# Patient Record
Sex: FEMALE | Race: OTHER | NOT HISPANIC OR LATINO | URBAN - METROPOLITAN AREA
[De-identification: names, ages, dates, MRNs, and addresses within clinical notes are randomized per-mention and may not be internally consistent; named-entity substitution may affect disease eponyms.]

---

## 2017-10-04 PROBLEM — Z00.00 ENCOUNTER FOR PREVENTIVE HEALTH EXAMINATION: Status: ACTIVE | Noted: 2017-10-04

## 2021-01-20 ENCOUNTER — EMERGENCY (EMERGENCY)
Facility: HOSPITAL | Age: 31
LOS: 1 days | Discharge: HOME | End: 2021-01-20
Attending: EMERGENCY MEDICINE
Payer: MEDICAID

## 2021-01-20 VITALS
DIASTOLIC BLOOD PRESSURE: 75 MMHG | RESPIRATION RATE: 18 BRPM | OXYGEN SATURATION: 100 % | HEART RATE: 93 BPM | TEMPERATURE: 98 F | SYSTOLIC BLOOD PRESSURE: 138 MMHG

## 2021-01-20 VITALS
WEIGHT: 259.93 LBS | OXYGEN SATURATION: 100 % | DIASTOLIC BLOOD PRESSURE: 91 MMHG | HEIGHT: 63 IN | SYSTOLIC BLOOD PRESSURE: 159 MMHG | HEART RATE: 109 BPM | TEMPERATURE: 98 F | RESPIRATION RATE: 18 BRPM

## 2021-01-20 LAB
ALBUMIN SERPL ELPH-MCNC: 3.8 G/DL — SIGNIFICANT CHANGE UP (ref 3.3–5)
ALP SERPL-CCNC: 81 U/L — SIGNIFICANT CHANGE UP (ref 40–120)
ALT FLD-CCNC: 10 U/L — SIGNIFICANT CHANGE UP (ref 10–45)
ANION GAP SERPL CALC-SCNC: 15 MMOL/L — SIGNIFICANT CHANGE UP (ref 5–17)
AST SERPL-CCNC: 11 U/L — SIGNIFICANT CHANGE UP (ref 10–40)
BASOPHILS # BLD AUTO: 0.05 K/UL — SIGNIFICANT CHANGE UP (ref 0–0.2)
BASOPHILS NFR BLD AUTO: 0.6 % — SIGNIFICANT CHANGE UP (ref 0–2)
BILIRUB SERPL-MCNC: 0.2 MG/DL — SIGNIFICANT CHANGE UP (ref 0.2–1.2)
BUN SERPL-MCNC: 19 MG/DL — SIGNIFICANT CHANGE UP (ref 7–23)
CALCIUM SERPL-MCNC: 9.3 MG/DL — SIGNIFICANT CHANGE UP (ref 8.4–10.5)
CHLORIDE SERPL-SCNC: 104 MMOL/L — SIGNIFICANT CHANGE UP (ref 96–108)
CO2 SERPL-SCNC: 20 MMOL/L — LOW (ref 22–31)
CREAT SERPL-MCNC: 0.78 MG/DL — SIGNIFICANT CHANGE UP (ref 0.5–1.3)
EOSINOPHIL # BLD AUTO: 0.32 K/UL — SIGNIFICANT CHANGE UP (ref 0–0.5)
EOSINOPHIL NFR BLD AUTO: 3.7 % — SIGNIFICANT CHANGE UP (ref 0–6)
GLUCOSE SERPL-MCNC: 159 MG/DL — HIGH (ref 70–99)
HCT VFR BLD CALC: 31.3 % — LOW (ref 34.5–45)
HGB BLD-MCNC: 9.2 G/DL — LOW (ref 11.5–15.5)
IMM GRANULOCYTES NFR BLD AUTO: 0.3 % — SIGNIFICANT CHANGE UP (ref 0–1.5)
LYMPHOCYTES # BLD AUTO: 2.73 K/UL — SIGNIFICANT CHANGE UP (ref 1–3.3)
LYMPHOCYTES # BLD AUTO: 31.5 % — SIGNIFICANT CHANGE UP (ref 13–44)
MCHC RBC-ENTMCNC: 21.9 PG — LOW (ref 27–34)
MCHC RBC-ENTMCNC: 29.4 GM/DL — LOW (ref 32–36)
MCV RBC AUTO: 74.3 FL — LOW (ref 80–100)
MONOCYTES # BLD AUTO: 0.58 K/UL — SIGNIFICANT CHANGE UP (ref 0–0.9)
MONOCYTES NFR BLD AUTO: 6.7 % — SIGNIFICANT CHANGE UP (ref 2–14)
NEUTROPHILS # BLD AUTO: 4.95 K/UL — SIGNIFICANT CHANGE UP (ref 1.8–7.4)
NEUTROPHILS NFR BLD AUTO: 57.2 % — SIGNIFICANT CHANGE UP (ref 43–77)
NRBC # BLD: 0 /100 WBCS — SIGNIFICANT CHANGE UP (ref 0–0)
PLATELET # BLD AUTO: 359 K/UL — SIGNIFICANT CHANGE UP (ref 150–400)
POTASSIUM SERPL-MCNC: 4.2 MMOL/L — SIGNIFICANT CHANGE UP (ref 3.5–5.3)
POTASSIUM SERPL-SCNC: 4.2 MMOL/L — SIGNIFICANT CHANGE UP (ref 3.5–5.3)
PROT SERPL-MCNC: 8.5 G/DL — HIGH (ref 6–8.3)
RBC # BLD: 4.21 M/UL — SIGNIFICANT CHANGE UP (ref 3.8–5.2)
RBC # FLD: 17 % — HIGH (ref 10.3–14.5)
SODIUM SERPL-SCNC: 139 MMOL/L — SIGNIFICANT CHANGE UP (ref 135–145)
WBC # BLD: 8.66 K/UL — SIGNIFICANT CHANGE UP (ref 3.8–10.5)
WBC # FLD AUTO: 8.66 K/UL — SIGNIFICANT CHANGE UP (ref 3.8–10.5)

## 2021-01-20 PROCEDURE — 73562 X-RAY EXAM OF KNEE 3: CPT

## 2021-01-20 PROCEDURE — 80053 COMPREHEN METABOLIC PANEL: CPT

## 2021-01-20 PROCEDURE — 85025 COMPLETE CBC W/AUTO DIFF WBC: CPT

## 2021-01-20 PROCEDURE — 99284 EMERGENCY DEPT VISIT MOD MDM: CPT

## 2021-01-20 PROCEDURE — 73562 X-RAY EXAM OF KNEE 3: CPT | Mod: 26,50

## 2021-01-20 RX ORDER — ACETAMINOPHEN 500 MG
650 TABLET ORAL ONCE
Refills: 0 | Status: COMPLETED | OUTPATIENT
Start: 2021-01-20 | End: 2021-01-20

## 2021-01-20 RX ORDER — MELOXICAM 15 MG/1
1 TABLET ORAL
Qty: 20 | Refills: 0
Start: 2021-01-20 | End: 2021-02-08

## 2021-01-20 RX ORDER — IBUPROFEN 200 MG
800 TABLET ORAL ONCE
Refills: 0 | Status: COMPLETED | OUTPATIENT
Start: 2021-01-20 | End: 2021-01-20

## 2021-01-20 RX ADMIN — Medication 650 MILLIGRAM(S): at 21:09

## 2021-01-20 RX ADMIN — Medication 800 MILLIGRAM(S): at 23:41

## 2021-01-20 NOTE — ED PROVIDER NOTE - NSFOLLOWUPCLINICS_GEN_ALL_ED_FT
[facility] Rheumatology  Rheumatology  [address] [address]  Phone: [phone]  Fax:   Follow Up Time: 7-10 Days

## 2021-01-20 NOTE — ED PROVIDER NOTE - RAPID ASSESSMENT
30 y.o F with PMHx of DM, and HTN presents with bilateral knee pain x4 months. States pain first began only in her right knee. Went to urgent care and was noted to have fluid in her knee on XR. States she has had difficulty standing for long periods of time due to pain. +swelling. Pt noted bilateral knee pain which prompted ED visit. Denies fever, chills, or knee surgeries.     Scribe Statement: I, Karlee Sweet, attest that this documentation has been prepared under the direction and in the presence of Greg Ruiz) 30 y.o F with PMHx of DM, and HTN presents with bilateral knee pain x4 months. States pain first began only in her right knee. Went to urgent care and was noted to have fluid in her knee on XR. States she has had difficulty standing for long periods of time due to pain. +swelling. Pt noted bilateral knee pain which prompted ED visit. Denies fever, chills, or knee surgeries.     Scribe Statement: I, Karlee Sweet, attest that this documentation has been prepared under the direction and in the presence of Greg Ruiz)    I, Dr. Ruiz, personally performed the service described in the documentation recorded by the scribe in my presence, and it accurately and completely records my words and actions.

## 2021-01-20 NOTE — ED ADULT NURSE REASSESSMENT NOTE - NS ED NURSE REASSESS COMMENT FT1
Report received from ZULEIMA Londono. Pt a & o x 4, able to follow commands. Breathing spontaneous & nonlabored. Pt able to ambulate with steady gait but appears in pain when walking. Call bell within reach, bed in lowest position.

## 2021-01-20 NOTE — ED PROVIDER NOTE - PHYSICAL EXAMINATION
Attn - alert, nad, lower extrem - no swelling or effusions, tender popliteal fossa bilat.  slight joint line tenderness bilat, Lachman - neg, no leg swelling or edema. skin - no rash,

## 2021-01-20 NOTE — ED PROVIDER NOTE - PATIENT PORTAL LINK FT
You can access the FollowMyHealth Patient Portal offered by [facility] by registering at the following website: [email or web address]. By joining [employer]’s FollowMyHealth portal, you will also be able to view your health information using other applications (apps) compatible with our system.

## 2021-01-20 NOTE — ED PROVIDER NOTE - NSFOLLOWUPINSTRUCTIONS_ED_ALL_ED_FT
Rx - Mobic (meloxicam) 7.5 mgs once daily  No work tomorrow  Follow up with Rheumatology for further evaluation of premature arthritis

## 2021-01-20 NOTE — ED PROVIDER NOTE - CLINICAL SUMMARY MEDICAL DECISION MAKING FREE TEXT BOX
Attn - pt with gradual onset of bilat knee pain x months with xray with OA - early for age - referral to rheum, NSAIDs

## 2021-01-20 NOTE — ED ADULT NURSE NOTE - NSIMPLEMENTINTERV_GEN_ALL_ED
Implemented All Universal Safety Interventions:  [place] to call system. Call bell, personal items and telephone within reach. Instruct patient to call for assistance. Room bathroom lighting operational. Non-slip footwear when patient is off stretcher. Physically safe environment: no spills, clutter or unnecessary equipment. Stretcher in lowest position, wheels locked, appropriate side rails in place.

## 2021-01-20 NOTE — ED ADULT NURSE NOTE - HIV OFFER
Alert and oriented x 3, NCAT.  Nontoxic appearing. oral mucosa with MMM, no injection of posterior oropharynx. Eyes with EOMI, PEERL.  Neck Supple.  Lungs CTAB, normal respiratory rate, no distress.  Cardiac with regular rate and rhythm.  Abdomen is soft, NT, ND.  No rashes.  bilateral UE and LE with FROM and strength. Sensation intact. Opt out

## 2021-01-20 NOTE — ED ADULT NURSE NOTE - OBJECTIVE STATEMENT
Pt is a 30y F p/w b/l knee pain. Endorses R knee pain x 4 months L knee pain x 2 weeks. Pt denies trauma/injury to knees. Pt works as a  and spends a lot of time on her feet, reports some swelling and increased pain after work days. Endorses feeling of stiffness in her knees and some difficulty ambulating d/t pain. Denies numbness, tingling, SOB, chest pain, fevers, sick contacts. A&Ox4, ERAZO, lungs clear, distal pulses intact, abdomen soft nontender, skin intact. Side rails up for safety, call bell and personal items within reach, instructed to call for assistance, verbalizes understanding. Will continue to monitor.

## 2021-02-13 ENCOUNTER — APPOINTMENT (OUTPATIENT)
Age: 31
End: 2021-02-13

## 2021-04-15 NOTE — ED PROVIDER NOTE - RAPID ASSESSMENT DATE/TIME
[Normal] : the outer ears and nose were normal in appearance and the oropharynx was normal 20-Jan-2021 20:57

## 2021-10-17 ENCOUNTER — EMERGENCY (EMERGENCY)
Facility: HOSPITAL | Age: 31
LOS: 1 days | Discharge: HOME | End: 2021-10-17
Attending: EMERGENCY MEDICINE
Payer: MEDICAID

## 2021-10-17 VITALS
DIASTOLIC BLOOD PRESSURE: 90 MMHG | HEART RATE: 99 BPM | HEIGHT: 63 IN | OXYGEN SATURATION: 99 % | RESPIRATION RATE: 20 BRPM | TEMPERATURE: 98 F | SYSTOLIC BLOOD PRESSURE: 154 MMHG | WEIGHT: 259.93 LBS

## 2021-10-17 VITALS
HEART RATE: 82 BPM | OXYGEN SATURATION: 98 % | TEMPERATURE: 98 F | SYSTOLIC BLOOD PRESSURE: 135 MMHG | RESPIRATION RATE: 19 BRPM | DIASTOLIC BLOOD PRESSURE: 76 MMHG

## 2021-10-17 PROBLEM — E11.9 TYPE 2 DIABETES MELLITUS WITHOUT COMPLICATIONS: Chronic | Status: ACTIVE | Noted: 2021-01-20

## 2021-10-17 LAB
ALBUMIN SERPL ELPH-MCNC: 4.2 G/DL — SIGNIFICANT CHANGE UP (ref 3.3–5)
ALP SERPL-CCNC: 70 U/L — SIGNIFICANT CHANGE UP (ref 40–120)
ALT FLD-CCNC: 8 U/L — LOW (ref 10–45)
ANION GAP SERPL CALC-SCNC: 17 MMOL/L — SIGNIFICANT CHANGE UP (ref 5–17)
APPEARANCE UR: CLEAR — SIGNIFICANT CHANGE UP
APTT BLD: 30.5 SEC — SIGNIFICANT CHANGE UP (ref 27.5–35.5)
AST SERPL-CCNC: 17 U/L — SIGNIFICANT CHANGE UP (ref 10–40)
BACTERIA # UR AUTO: NEGATIVE — SIGNIFICANT CHANGE UP
BASE EXCESS BLDV CALC-SCNC: -2.2 MMOL/L — LOW (ref -2–2)
BASOPHILS # BLD AUTO: 0.05 K/UL — SIGNIFICANT CHANGE UP (ref 0–0.2)
BASOPHILS NFR BLD AUTO: 0.6 % — SIGNIFICANT CHANGE UP (ref 0–2)
BILIRUB SERPL-MCNC: 0.4 MG/DL — SIGNIFICANT CHANGE UP (ref 0.2–1.2)
BILIRUB UR-MCNC: ABNORMAL
BUN SERPL-MCNC: 15 MG/DL — SIGNIFICANT CHANGE UP (ref 7–23)
CA-I SERPL-SCNC: 1.19 MMOL/L — SIGNIFICANT CHANGE UP (ref 1.15–1.33)
CALCIUM SERPL-MCNC: 9.1 MG/DL — SIGNIFICANT CHANGE UP (ref 8.4–10.5)
CHLORIDE BLDV-SCNC: 103 MMOL/L — SIGNIFICANT CHANGE UP (ref 96–108)
CHLORIDE SERPL-SCNC: 100 MMOL/L — SIGNIFICANT CHANGE UP (ref 96–108)
CO2 BLDV-SCNC: 25 MMOL/L — SIGNIFICANT CHANGE UP (ref 22–26)
CO2 SERPL-SCNC: 19 MMOL/L — LOW (ref 22–31)
COLOR SPEC: YELLOW — SIGNIFICANT CHANGE UP
CREAT SERPL-MCNC: 0.86 MG/DL — SIGNIFICANT CHANGE UP (ref 0.5–1.3)
DIFF PNL FLD: ABNORMAL
EOSINOPHIL # BLD AUTO: 0.04 K/UL — SIGNIFICANT CHANGE UP (ref 0–0.5)
EOSINOPHIL NFR BLD AUTO: 0.4 % — SIGNIFICANT CHANGE UP (ref 0–6)
EPI CELLS # UR: 4 /HPF — SIGNIFICANT CHANGE UP
GAS PNL BLDV: 137 MMOL/L — SIGNIFICANT CHANGE UP (ref 136–145)
GAS PNL BLDV: SIGNIFICANT CHANGE UP
GAS PNL BLDV: SIGNIFICANT CHANGE UP
GLUCOSE BLDV-MCNC: 98 MG/DL — SIGNIFICANT CHANGE UP (ref 70–99)
GLUCOSE SERPL-MCNC: 99 MG/DL — SIGNIFICANT CHANGE UP (ref 70–99)
GLUCOSE UR QL: NEGATIVE — SIGNIFICANT CHANGE UP
HCG SERPL-ACNC: <2 MIU/ML — SIGNIFICANT CHANGE UP
HCG UR QL: NEGATIVE — SIGNIFICANT CHANGE UP
HCO3 BLDV-SCNC: 24 MMOL/L — SIGNIFICANT CHANGE UP (ref 22–29)
HCT VFR BLD CALC: 28.2 % — LOW (ref 34.5–45)
HCT VFR BLDA CALC: 26 % — LOW (ref 34.5–46.5)
HGB BLD CALC-MCNC: 8.7 G/DL — LOW (ref 11.7–16.1)
HGB BLD-MCNC: 8.2 G/DL — LOW (ref 11.5–15.5)
HYALINE CASTS # UR AUTO: 8 /LPF — HIGH (ref 0–2)
IMM GRANULOCYTES NFR BLD AUTO: 0.3 % — SIGNIFICANT CHANGE UP (ref 0–1.5)
INR BLD: 1.18 RATIO — HIGH (ref 0.88–1.16)
KETONES UR-MCNC: ABNORMAL
LACTATE BLDV-MCNC: 1.5 MMOL/L — SIGNIFICANT CHANGE UP (ref 0.7–2)
LEUKOCYTE ESTERASE UR-ACNC: NEGATIVE — SIGNIFICANT CHANGE UP
LIDOCAIN IGE QN: 26 U/L — SIGNIFICANT CHANGE UP (ref 7–60)
LYMPHOCYTES # BLD AUTO: 1.3 K/UL — SIGNIFICANT CHANGE UP (ref 1–3.3)
LYMPHOCYTES # BLD AUTO: 14.3 % — SIGNIFICANT CHANGE UP (ref 13–44)
MCHC RBC-ENTMCNC: 21.8 PG — LOW (ref 27–34)
MCHC RBC-ENTMCNC: 29.1 GM/DL — LOW (ref 32–36)
MCV RBC AUTO: 75 FL — LOW (ref 80–100)
MONOCYTES # BLD AUTO: 0.48 K/UL — SIGNIFICANT CHANGE UP (ref 0–0.9)
MONOCYTES NFR BLD AUTO: 5.3 % — SIGNIFICANT CHANGE UP (ref 2–14)
NEUTROPHILS # BLD AUTO: 7.19 K/UL — SIGNIFICANT CHANGE UP (ref 1.8–7.4)
NEUTROPHILS NFR BLD AUTO: 79.1 % — HIGH (ref 43–77)
NITRITE UR-MCNC: NEGATIVE — SIGNIFICANT CHANGE UP
NRBC # BLD: 0 /100 WBCS — SIGNIFICANT CHANGE UP (ref 0–0)
OTHER CELLS CSF MANUAL: 5.2 ML/DL — LOW (ref 18–22)
PCO2 BLDV: 45 MMHG — HIGH (ref 39–42)
PH BLDV: 7.33 — SIGNIFICANT CHANGE UP (ref 7.32–7.43)
PH UR: 6 — SIGNIFICANT CHANGE UP (ref 5–8)
PLATELET # BLD AUTO: 395 K/UL — SIGNIFICANT CHANGE UP (ref 150–400)
PO2 BLDV: 28 MMHG — SIGNIFICANT CHANGE UP (ref 25–45)
POTASSIUM BLDV-SCNC: 4.7 MMOL/L — SIGNIFICANT CHANGE UP (ref 3.5–5.1)
POTASSIUM SERPL-MCNC: 4.3 MMOL/L — SIGNIFICANT CHANGE UP (ref 3.5–5.3)
POTASSIUM SERPL-SCNC: 4.3 MMOL/L — SIGNIFICANT CHANGE UP (ref 3.5–5.3)
PROT SERPL-MCNC: 8.4 G/DL — HIGH (ref 6–8.3)
PROT UR-MCNC: ABNORMAL
PROTHROM AB SERPL-ACNC: 14.1 SEC — HIGH (ref 10.6–13.6)
RBC # BLD: 3.76 M/UL — LOW (ref 3.8–5.2)
RBC # FLD: 18.9 % — HIGH (ref 10.3–14.5)
RBC CASTS # UR COMP ASSIST: 98 /HPF — HIGH (ref 0–4)
SAO2 % BLDV: 43 % — LOW (ref 67–88)
SODIUM SERPL-SCNC: 136 MMOL/L — SIGNIFICANT CHANGE UP (ref 135–145)
SP GR SPEC: 1.03 — HIGH (ref 1.01–1.02)
UROBILINOGEN FLD QL: ABNORMAL
WBC # BLD: 9.09 K/UL — SIGNIFICANT CHANGE UP (ref 3.8–10.5)
WBC # FLD AUTO: 9.09 K/UL — SIGNIFICANT CHANGE UP (ref 3.8–10.5)
WBC UR QL: 4 /HPF — SIGNIFICANT CHANGE UP (ref 0–5)

## 2021-10-17 PROCEDURE — 85730 THROMBOPLASTIN TIME PARTIAL: CPT

## 2021-10-17 PROCEDURE — 81001 URINALYSIS AUTO W/SCOPE: CPT

## 2021-10-17 PROCEDURE — 82435 ASSAY OF BLOOD CHLORIDE: CPT

## 2021-10-17 PROCEDURE — 84132 ASSAY OF SERUM POTASSIUM: CPT

## 2021-10-17 PROCEDURE — 87086 URINE CULTURE/COLONY COUNT: CPT

## 2021-10-17 PROCEDURE — 80053 COMPREHEN METABOLIC PANEL: CPT

## 2021-10-17 PROCEDURE — 99285 EMERGENCY DEPT VISIT HI MDM: CPT

## 2021-10-17 PROCEDURE — 83605 ASSAY OF LACTIC ACID: CPT

## 2021-10-17 PROCEDURE — 93975 VASCULAR STUDY: CPT | Mod: 26

## 2021-10-17 PROCEDURE — 76856 US EXAM PELVIC COMPLETE: CPT

## 2021-10-17 PROCEDURE — 76856 US EXAM PELVIC COMPLETE: CPT | Mod: 26,59

## 2021-10-17 PROCEDURE — 76830 TRANSVAGINAL US NON-OB: CPT | Mod: 26

## 2021-10-17 PROCEDURE — 93975 VASCULAR STUDY: CPT

## 2021-10-17 PROCEDURE — 85610 PROTHROMBIN TIME: CPT

## 2021-10-17 PROCEDURE — 85025 COMPLETE CBC W/AUTO DIFF WBC: CPT

## 2021-10-17 PROCEDURE — 85014 HEMATOCRIT: CPT

## 2021-10-17 PROCEDURE — 85018 HEMOGLOBIN: CPT

## 2021-10-17 PROCEDURE — 96372 THER/PROPH/DIAG INJ SC/IM: CPT

## 2021-10-17 PROCEDURE — 81025 URINE PREGNANCY TEST: CPT

## 2021-10-17 PROCEDURE — 83690 ASSAY OF LIPASE: CPT

## 2021-10-17 PROCEDURE — 84702 CHORIONIC GONADOTROPIN TEST: CPT

## 2021-10-17 PROCEDURE — 84295 ASSAY OF SERUM SODIUM: CPT

## 2021-10-17 PROCEDURE — 99284 EMERGENCY DEPT VISIT MOD MDM: CPT | Mod: 25

## 2021-10-17 PROCEDURE — 82330 ASSAY OF CALCIUM: CPT

## 2021-10-17 PROCEDURE — 82947 ASSAY GLUCOSE BLOOD QUANT: CPT

## 2021-10-17 PROCEDURE — 82803 BLOOD GASES ANY COMBINATION: CPT

## 2021-10-17 PROCEDURE — 76830 TRANSVAGINAL US NON-OB: CPT

## 2021-10-17 RX ORDER — ONDANSETRON 8 MG/1
4 TABLET, FILM COATED ORAL ONCE
Refills: 0 | Status: COMPLETED | OUTPATIENT
Start: 2021-10-17 | End: 2021-10-17

## 2021-10-17 RX ORDER — KETOROLAC TROMETHAMINE 30 MG/ML
30 SYRINGE (ML) INJECTION ONCE
Refills: 0 | Status: DISCONTINUED | OUTPATIENT
Start: 2021-10-17 | End: 2021-10-17

## 2021-10-17 RX ORDER — SODIUM CHLORIDE 9 MG/ML
1000 INJECTION INTRAMUSCULAR; INTRAVENOUS; SUBCUTANEOUS ONCE
Refills: 0 | Status: DISCONTINUED | OUTPATIENT
Start: 2021-10-17 | End: 2021-10-17

## 2021-10-17 RX ORDER — ONDANSETRON 8 MG/1
4 TABLET, FILM COATED ORAL ONCE
Refills: 0 | Status: DISCONTINUED | OUTPATIENT
Start: 2021-10-17 | End: 2021-10-17

## 2021-10-17 RX ADMIN — Medication 30 MILLIGRAM(S): at 15:37

## 2021-10-17 RX ADMIN — Medication 30 MILLIGRAM(S): at 13:42

## 2021-10-17 RX ADMIN — ONDANSETRON 4 MILLIGRAM(S): 8 TABLET, FILM COATED ORAL at 13:41

## 2021-10-17 NOTE — ED ADULT NURSE REASSESSMENT NOTE - NS ED NURSE REASSESS COMMENT FT1
unable to obtain PIV access, RN attempted 2x, MD & NP made aware, NP will attempt to gain PIV access. unable to obtain PIV access, RN attempted 2x, labs sent- MD & NP made aware, NP will attempt to gain PIV access.

## 2021-10-17 NOTE — ED ADULT NURSE REASSESSMENT NOTE - NS ED NURSE REASSESS COMMENT FT1
RN Break Coverage [phone]; report given to assigned RN Gilberto, made aware of unable to gain PIV access. RN Break Coverage [phone]; report given to assigned RN Anuradha, made aware of unable to gain PIV access.

## 2021-10-17 NOTE — ED ADULT NURSE REASSESSMENT NOTE - NS ED NURSE REASSESS COMMENT FT1
Pt is breathing unlabored on RA. Vital signs stable. Pt reports improvement in pain. Per NP, pt can drink, pt provided water. Educated pt on plan of care. Safety and comfort maintained. Awaiting US. Call bell within reach.

## 2021-10-17 NOTE — ED ADULT NURSE REASSESSMENT NOTE - NS ED NURSE REASSESS COMMENT FT1
Pt is breathing unlabored on RA. Vital signs stable. Educated pt on plan of care. Safety and comfort maintained. Warm blanket, warm packs, and water provided. Awaiting US results. Call bell within reach.

## 2021-10-17 NOTE — ED PROVIDER NOTE - NSFOLLOWUPINSTRUCTIONS_ED_ALL_ED_FT
Hydrate.    Take Ibuprofen or Tylenol for pain as package directed and respect warnings on the label.    Follow up with your GYN for reevaluation or clinic, [phone], call tomorrow for appointment.    Return for any concerns or worsening symptoms.

## 2021-10-17 NOTE — ED ADULT NURSE NOTE - NSIMPLEMENTINTERV_GEN_ALL_ED
Implemented All Universal Safety Interventions:  [place] to call system. Call bell, personal items and telephone within reach. Instruct patient to call for assistance. Room bathroom lighting operational. Non-slip footwear when patient is off stretcher. Physically safe environment: no spills, clutter or unnecessary equipment. Stretcher in lowest position, wheels locked, appropriate side rails in place.

## 2021-10-17 NOTE — ED PROVIDER NOTE - NSFOLLOWUPCLINICS_GEN_ALL_ED_FT
[facility] Gynecology and Obstetrics  Gynceology/OB  [address] [place] [address]  Phone: [phone]  Fax:

## 2021-10-17 NOTE — ED PROVIDER NOTE - OBJECTIVE STATEMENT
30yo female pt, no significant PMHx, 30yo female pt, no significant PMHx, presents to ED with lower abd pain and vaginal bleeding. Pt stated normally heavy menstrual period with PMS and usually improved after NSAIDs). She noticed this month period started 2days ago with lower abdominal cramping and took Advil without relief. No previous US. Denies urinary problems. Denies fever, chills, cough, congestion. Denies headache, dizziness, or neck/back pain. Denies CP/SOB. Denies sensory changes or weakness to extremities.

## 2021-10-17 NOTE — ED PROVIDER NOTE - PATIENT PORTAL LINK FT
You can access the FollowMyHealth Patient Portal offered by [facility] by registering at the following website: [email or web address]. By joining [employer]’s FollowMyHealth portal, you will also be able to view your health information using other applications (apps) compatible with our system.

## 2021-10-17 NOTE — ED PROVIDER NOTE - ATTENDING CONTRIBUTION TO CARE
32 y/o female presenting with l eye swelling, injected, mild pain with movement, CT orbits, iv abx, labs, mild pain, not itchy, seen at urgent care started abx ariel ointment, cephalosporin for preseptal cellulitis took for 24 hrs but not improving, no visual changes, ophth consulted, possible cdu vs admission. vss, doesn't meet SS criteria. pt is a 32 y/o female with lower abdominal pain consistent with crampy menstrual pain, not improving with motrin/apap which usually improves her menstraul pain, crampy lower abd pain with n/v, no pain over mcbruneys point, vaginal exam, us, labs, analgesia ordered.

## 2021-10-17 NOTE — ED ADULT NURSE NOTE - OBJECTIVE STATEMENT
pt is a 31y female A&Ox4 pmhx DM2, presenting to ED complaining of with lower abdominal pain. pt states the pain is attributed to her menstrual cycle and normally has heavy bleeding and cramping. she is usually able to control her pain with Tylenol/ Motrin but for the past 3 days she has not been able to causing her to not be able to tolerate any PO intake, states when she try's to eat she vomits each time, states she did not take nay medication or attempt to eat today.  she also complains of mild back pain, NV. denies urinary symptoms, fever, diarrhea, headache, cold symptoms, exposure to sick contact. pt states she is not COVID vaccinated.

## 2021-10-19 LAB
CULTURE RESULTS: SIGNIFICANT CHANGE UP
SPECIMEN SOURCE: SIGNIFICANT CHANGE UP

## 2022-04-06 ENCOUNTER — EMERGENCY (EMERGENCY)
Facility: HOSPITAL | Age: 32
LOS: 1 days | Discharge: HOME | End: 2022-04-06
Attending: EMERGENCY MEDICINE
Payer: MEDICAID

## 2022-04-06 VITALS
DIASTOLIC BLOOD PRESSURE: 97 MMHG | TEMPERATURE: 98 F | OXYGEN SATURATION: 100 % | RESPIRATION RATE: 18 BRPM | WEIGHT: 270.07 LBS | HEART RATE: 116 BPM | SYSTOLIC BLOOD PRESSURE: 160 MMHG | HEIGHT: 63 IN

## 2022-04-06 LAB
GAS PNL BLDV: SIGNIFICANT CHANGE UP
HCT VFR BLD CALC: 26.2 % — LOW (ref 34.5–45)
HGB BLD-MCNC: 7.8 G/DL — LOW (ref 11.5–15.5)
MCHC RBC-ENTMCNC: 21.5 PG — LOW (ref 27–34)
MCHC RBC-ENTMCNC: 29.8 GM/DL — LOW (ref 32–36)
MCV RBC AUTO: 72.4 FL — LOW (ref 80–100)
PLATELET # BLD AUTO: 407 K/UL — HIGH (ref 150–400)
RBC # BLD: 3.62 M/UL — LOW (ref 3.8–5.2)
RBC # FLD: 17.3 % — HIGH (ref 10.3–14.5)
WBC # BLD: 9.48 K/UL — SIGNIFICANT CHANGE UP (ref 3.8–10.5)
WBC # FLD AUTO: 9.48 K/UL — SIGNIFICANT CHANGE UP (ref 3.8–10.5)

## 2022-04-06 PROCEDURE — 76937 US GUIDE VASCULAR ACCESS: CPT | Mod: 26

## 2022-04-06 PROCEDURE — 99285 EMERGENCY DEPT VISIT HI MDM: CPT

## 2022-04-06 RX ORDER — ACETAMINOPHEN 500 MG
975 TABLET ORAL ONCE
Refills: 0 | Status: COMPLETED | OUTPATIENT
Start: 2022-04-06 | End: 2022-04-06

## 2022-04-06 RX ORDER — ONDANSETRON 8 MG/1
4 TABLET, FILM COATED ORAL ONCE
Refills: 0 | Status: COMPLETED | OUTPATIENT
Start: 2022-04-06 | End: 2022-04-06

## 2022-04-06 RX ORDER — KETOROLAC TROMETHAMINE 30 MG/ML
15 SYRINGE (ML) INJECTION ONCE
Refills: 0 | Status: DISCONTINUED | OUTPATIENT
Start: 2022-04-06 | End: 2022-04-06

## 2022-04-06 RX ORDER — SODIUM CHLORIDE 9 MG/ML
1000 INJECTION INTRAMUSCULAR; INTRAVENOUS; SUBCUTANEOUS ONCE
Refills: 0 | Status: COMPLETED | OUTPATIENT
Start: 2022-04-06 | End: 2022-04-06

## 2022-04-06 RX ORDER — MORPHINE SULFATE 50 MG/1
4 CAPSULE, EXTENDED RELEASE ORAL ONCE
Refills: 0 | Status: DISCONTINUED | OUTPATIENT
Start: 2022-04-06 | End: 2022-04-06

## 2022-04-06 RX ADMIN — SODIUM CHLORIDE 1000 MILLILITER(S): 9 INJECTION INTRAMUSCULAR; INTRAVENOUS; SUBCUTANEOUS at 23:18

## 2022-04-06 RX ADMIN — MORPHINE SULFATE 4 MILLIGRAM(S): 50 CAPSULE, EXTENDED RELEASE ORAL at 23:19

## 2022-04-06 RX ADMIN — ONDANSETRON 4 MILLIGRAM(S): 8 TABLET, FILM COATED ORAL at 23:19

## 2022-04-06 RX ADMIN — Medication 975 MILLIGRAM(S): at 23:20

## 2022-04-06 RX ADMIN — Medication 15 MILLIGRAM(S): at 23:19

## 2022-04-06 NOTE — ED PROVIDER NOTE - PROGRESS NOTE DETAILS
Evan PGY1  Patient reassessed and reports significant improvement of pain, currently 2/10.   Bloodwork showed hgh of 7.8, which is around patient's baseline. otherwise unremarkable. Pending TVUSr. Evan PGY1   Asked patient to provide a urine culture, however patient reports that she is unable to provide a sample because she doesn't have the need. Patient denies having any fever, chills, dysuria, hematuria, increased urinary frequency. Reports 1/10 pain and states that this pain is consistent with her menstrual cramps. Also updated patient on her bloodwork, specifically regarding her low hemoglobin.   Will provide PMD and GYN follow up information.

## 2022-04-06 NOTE — ED PROVIDER NOTE - NSFOLLOWUPINSTRUCTIONS_ED_ALL_ED_FT
You were seen in the emergency department for left lower abdominal pain. You were given fluids, tylenol, toradol, morphine and zofran.     For pain, you may take Tylenol (acetaminophen) and/or ibuprofen (advil or motrin). Please follow the instructions on the label/container.     Please follow up with internal medicine within 48 hours for continuation of care.   Please follow up with obgyn within 48 hours for further management.     Return to the emergency department if you experience any new/concerning/worsening symptoms such as but not limited to: fever (>100.3F), intractable nausea, vomiting, chest pain, shortness of breath, worsening pain not improving with pain medications, pain with urination, increased urination.

## 2022-04-06 NOTE — ED PROVIDER NOTE - OBJECTIVE STATEMENT
Patient is a 32 year-old-female with no past medical history presents with 3-day history of persistent severe left lower abdominal pain. States that she started her period on 4/1 which lasted 3 days, she then developed left lower abdominal pain. Feels like her menstrual cramps, however not responding to pain medications and unsure that it happened after her period. + nausea. Denies fever, chills, vomiting, urinary/bowel complaints. Sexually active with 1 partner in the last year, no history of GC/Chlam.

## 2022-04-06 NOTE — ED PROVIDER NOTE - PHYSICAL EXAMINATION
General: appears uncomfortable and in pain  HEENT: atraumatic, normocephalic; pupils are equal, round and react to light, extraocular movements intact bilaterally without deficits, no conjunctival pallor, mucous membranes moist  Neck: no jugular venous distension, full range of motion  Chest/Lung: clear to auscultation bilaterally, no wheezes/rhonchi/rales  Heart: regular rate and rhythm, no murmur/gallops/rubs  Abdomen: normal bowel sounds, soft, LLQ tenderness   Extremities: no lower extremity edema, +2 radial pulses bilaterally, +2 dorsalis pedis pulses bilaterally  Musculoskeletal: full range of motion of all 4 extremities  Nervous System: alert and oriented, no motor deficits or sensory deficits; CNII-XII grossly intact; no focal neurologic deficits  Skin: no rashes/lacerations noted

## 2022-04-06 NOTE — ED PROVIDER NOTE - NSFOLLOWUPCLINICS_GEN_ALL_ED_FT
[facility]  OB/GYN & Surg  [address] [place] [address]  Phone: [phone]  Fax:     [facility] General Internal Medicine  General Internal Medicine  [address] [place] [address]  Phone: [phone]  Fax:     [facility] Gynecology and Obstetrics  Gynceology/OB  [address] [place] [address]  Phone: [phone]  Fax:     [facility] [place]  Internal Medicine  [address] [place] [address]  Phone: [phone]  Fax: [phone]

## 2022-04-06 NOTE — ED PROVIDER NOTE - CLINICAL SUMMARY MEDICAL DECISION MAKING FREE TEXT BOX
Patient is a 32 year-old-female with no past medical history presents with 3-day history of persistent severe left lower abdominal pain. Rupture ovarian cyst vs. ovarian torsion vs. endometriosis. Low suspicion for PID/TOA due to self reported sexual history. CBC, CMP, VBG, HCG, UA, UC, TVUS. Fluids, zofran, toradol, tylenol and morphine. Dispo pending workup.

## 2022-04-06 NOTE — ED PROVIDER NOTE - NSFOLLOWUPCLINICSTOKEN_GEN_ALL_ED_FT
[id number]: || ||00\01||False;[id number]: || ||00\01||False;[id number]: || ||00\01||False;[id number]: || ||00\01||False;

## 2022-04-06 NOTE — ED PROCEDURE NOTE - PROCEDURE ADDITIONAL DETAILS
POCUS: Emergency Department Focused Ultrasound performed at patient's bedside.  The complete report may be available in PACS.   Peripheral IV access in the Emergency Department obtained under dynamic ultrasound guidance with dark nonpulsatile blood return.  Catheter was flushed afterwards without any resistance or resultant extravasation.  IV catheter confirmed in compressible vein after insertion.   18 gauge to left forearm vein

## 2022-04-06 NOTE — ED ADULT NURSE NOTE - OBJECTIVE STATEMENT
33 y/o female no significant pmh or psh presenting to ED c/o worsening persistent LLQ pain associated with nausea and dec PO intake x the passed 3 days. pt states she initially thought the discomfort was related to her menstrual cycle but states that her cycle has been over and she is still experiencing the pain, denies any vomiting but has avoided eating or drinking d/t feeling nauseas. denies any urinary s/s, dizziness, diarrhea, fevers, cough, cp, sob, known sick contacts or recent travel. pt pending ultrasound guided IV placement d/t difficult access and vasculature. pending ultrasound, safety maintained, call bell within reach.

## 2022-04-06 NOTE — ED PROVIDER NOTE - PATIENT PORTAL LINK FT
You can access the FollowMyHealth Patient Portal offered by [facility] by registering at the following website: [email or web address]. By joining [employer]’s FollowMyHealth portal, you will also be able to view your health information using other applications (apps) compatible with our system.

## 2022-04-06 NOTE — ED ADULT NURSE REASSESSMENT NOTE - NS ED NURSE REASSESS COMMENT FT1
assumed care from previous RN. MD at bedside placing USG PIV. Once placed, pt medicated as per eMAR. Labs drawn by MD and sent by MD. Pt aware to get undressed for further examination.

## 2022-04-07 VITALS
OXYGEN SATURATION: 100 % | RESPIRATION RATE: 17 BRPM | HEART RATE: 92 BPM | TEMPERATURE: 99 F | DIASTOLIC BLOOD PRESSURE: 69 MMHG | SYSTOLIC BLOOD PRESSURE: 113 MMHG

## 2022-04-07 LAB
ALBUMIN SERPL ELPH-MCNC: 3.9 G/DL — SIGNIFICANT CHANGE UP (ref 3.3–5)
ALP SERPL-CCNC: 76 U/L — SIGNIFICANT CHANGE UP (ref 40–120)
ALT FLD-CCNC: 9 U/L — LOW (ref 10–45)
ANION GAP SERPL CALC-SCNC: 16 MMOL/L — SIGNIFICANT CHANGE UP (ref 5–17)
AST SERPL-CCNC: 7 U/L — LOW (ref 10–40)
BASE EXCESS BLDV CALC-SCNC: -1.4 MMOL/L — SIGNIFICANT CHANGE UP (ref -2–2)
BASOPHILS # BLD AUTO: 0.16 K/UL — SIGNIFICANT CHANGE UP (ref 0–0.2)
BASOPHILS NFR BLD AUTO: 1.7 % — SIGNIFICANT CHANGE UP (ref 0–2)
BILIRUB SERPL-MCNC: 0.6 MG/DL — SIGNIFICANT CHANGE UP (ref 0.2–1.2)
BUN SERPL-MCNC: 11 MG/DL — SIGNIFICANT CHANGE UP (ref 7–23)
CA-I SERPL-SCNC: 1.16 MMOL/L — SIGNIFICANT CHANGE UP (ref 1.15–1.33)
CALCIUM SERPL-MCNC: 9 MG/DL — SIGNIFICANT CHANGE UP (ref 8.4–10.5)
CHLORIDE BLDV-SCNC: 104 MMOL/L — SIGNIFICANT CHANGE UP (ref 96–108)
CHLORIDE SERPL-SCNC: 102 MMOL/L — SIGNIFICANT CHANGE UP (ref 96–108)
CO2 BLDV-SCNC: 24 MMOL/L — SIGNIFICANT CHANGE UP (ref 22–26)
CO2 SERPL-SCNC: 20 MMOL/L — LOW (ref 22–31)
CREAT SERPL-MCNC: 0.94 MG/DL — SIGNIFICANT CHANGE UP (ref 0.5–1.3)
EGFR: 84 ML/MIN/1.73M2 — SIGNIFICANT CHANGE UP
EOSINOPHIL # BLD AUTO: 0.09 K/UL — SIGNIFICANT CHANGE UP (ref 0–0.5)
EOSINOPHIL NFR BLD AUTO: 0.9 % — SIGNIFICANT CHANGE UP (ref 0–6)
GAS PNL BLDV: 137 MMOL/L — SIGNIFICANT CHANGE UP (ref 136–145)
GAS PNL BLDV: SIGNIFICANT CHANGE UP
GLUCOSE BLDV-MCNC: 249 MG/DL — HIGH (ref 70–99)
GLUCOSE SERPL-MCNC: 247 MG/DL — HIGH (ref 70–99)
HCG SERPL-ACNC: <2 MIU/ML — SIGNIFICANT CHANGE UP
HCO3 BLDV-SCNC: 23 MMOL/L — SIGNIFICANT CHANGE UP (ref 22–29)
HCT VFR BLDA CALC: 24 % — LOW (ref 34.5–46.5)
HGB BLD CALC-MCNC: 7.9 G/DL — LOW (ref 11.7–16.1)
HOROWITZ INDEX BLDV+IHG-RTO: SIGNIFICANT CHANGE UP
LACTATE BLDV-MCNC: 2.1 MMOL/L — HIGH (ref 0.7–2)
LIDOCAIN IGE QN: 22 U/L — SIGNIFICANT CHANGE UP (ref 7–60)
LYMPHOCYTES # BLD AUTO: 2.72 K/UL — SIGNIFICANT CHANGE UP (ref 1–3.3)
LYMPHOCYTES # BLD AUTO: 28.7 % — SIGNIFICANT CHANGE UP (ref 13–44)
MANUAL SMEAR VERIFICATION: SIGNIFICANT CHANGE UP
MICROCYTES BLD QL: SIGNIFICANT CHANGE UP
MONOCYTES # BLD AUTO: 0.41 K/UL — SIGNIFICANT CHANGE UP (ref 0–0.9)
MONOCYTES NFR BLD AUTO: 4.3 % — SIGNIFICANT CHANGE UP (ref 2–14)
NEUTROPHILS # BLD AUTO: 6.11 K/UL — SIGNIFICANT CHANGE UP (ref 1.8–7.4)
NEUTROPHILS NFR BLD AUTO: 64.4 % — SIGNIFICANT CHANGE UP (ref 43–77)
OTHER CELLS CSF MANUAL: 5 ML/DL — LOW (ref 18–22)
PCO2 BLDV: 34 MMHG — LOW (ref 39–42)
PH BLDV: 7.43 — SIGNIFICANT CHANGE UP (ref 7.32–7.43)
PLAT MORPH BLD: NORMAL — SIGNIFICANT CHANGE UP
PO2 BLDV: 29 MMHG — SIGNIFICANT CHANGE UP (ref 25–45)
POTASSIUM BLDV-SCNC: 3.9 MMOL/L — SIGNIFICANT CHANGE UP (ref 3.5–5.1)
POTASSIUM SERPL-MCNC: 3.8 MMOL/L — SIGNIFICANT CHANGE UP (ref 3.5–5.3)
POTASSIUM SERPL-SCNC: 3.8 MMOL/L — SIGNIFICANT CHANGE UP (ref 3.5–5.3)
PROT SERPL-MCNC: 7.7 G/DL — SIGNIFICANT CHANGE UP (ref 6–8.3)
RBC BLD AUTO: ABNORMAL
SAO2 % BLDV: 45.1 % — LOW (ref 67–88)
SODIUM SERPL-SCNC: 138 MMOL/L — SIGNIFICANT CHANGE UP (ref 135–145)

## 2022-04-07 PROCEDURE — 99284 EMERGENCY DEPT VISIT MOD MDM: CPT | Mod: 25

## 2022-04-07 PROCEDURE — 76856 US EXAM PELVIC COMPLETE: CPT | Mod: 26,59

## 2022-04-07 PROCEDURE — 82435 ASSAY OF BLOOD CHLORIDE: CPT

## 2022-04-07 PROCEDURE — 84702 CHORIONIC GONADOTROPIN TEST: CPT

## 2022-04-07 PROCEDURE — 83605 ASSAY OF LACTIC ACID: CPT

## 2022-04-07 PROCEDURE — 82947 ASSAY GLUCOSE BLOOD QUANT: CPT

## 2022-04-07 PROCEDURE — 93975 VASCULAR STUDY: CPT | Mod: 26

## 2022-04-07 PROCEDURE — 76856 US EXAM PELVIC COMPLETE: CPT

## 2022-04-07 PROCEDURE — 96375 TX/PRO/DX INJ NEW DRUG ADDON: CPT

## 2022-04-07 PROCEDURE — 83690 ASSAY OF LIPASE: CPT

## 2022-04-07 PROCEDURE — 84132 ASSAY OF SERUM POTASSIUM: CPT

## 2022-04-07 PROCEDURE — 85014 HEMATOCRIT: CPT

## 2022-04-07 PROCEDURE — 76937 US GUIDE VASCULAR ACCESS: CPT

## 2022-04-07 PROCEDURE — 76830 TRANSVAGINAL US NON-OB: CPT

## 2022-04-07 PROCEDURE — 80053 COMPREHEN METABOLIC PANEL: CPT

## 2022-04-07 PROCEDURE — 84295 ASSAY OF SERUM SODIUM: CPT

## 2022-04-07 PROCEDURE — 82330 ASSAY OF CALCIUM: CPT

## 2022-04-07 PROCEDURE — 85018 HEMOGLOBIN: CPT

## 2022-04-07 PROCEDURE — 82565 ASSAY OF CREATININE: CPT

## 2022-04-07 PROCEDURE — 82803 BLOOD GASES ANY COMBINATION: CPT

## 2022-04-07 PROCEDURE — 85025 COMPLETE CBC W/AUTO DIFF WBC: CPT

## 2022-04-07 PROCEDURE — 76830 TRANSVAGINAL US NON-OB: CPT | Mod: 26

## 2022-04-07 PROCEDURE — 93975 VASCULAR STUDY: CPT

## 2022-04-07 PROCEDURE — 96374 THER/PROPH/DIAG INJ IV PUSH: CPT

## 2022-04-27 ENCOUNTER — RESULT REVIEW (OUTPATIENT)
Age: 32
End: 2022-04-27

## 2022-04-27 ENCOUNTER — OUTPATIENT (OUTPATIENT)
Dept: OUTPATIENT SERVICES | Facility: HOSPITAL | Age: 32
LOS: 1 days | End: 2022-04-27
Payer: MEDICAID

## 2022-04-27 ENCOUNTER — APPOINTMENT (OUTPATIENT)
Dept: OBGYN | Facility: HOSPITAL | Age: 32
End: 2022-04-27
Payer: MEDICAID

## 2022-04-27 VITALS
DIASTOLIC BLOOD PRESSURE: 81 MMHG | HEIGHT: 63 IN | TEMPERATURE: 98 F | SYSTOLIC BLOOD PRESSURE: 149 MMHG | BODY MASS INDEX: 51.24 KG/M2 | WEIGHT: 289.2 LBS | HEART RATE: 109 BPM

## 2022-04-27 DIAGNOSIS — Z82.49 FAMILY HISTORY OF ISCHEMIC HEART DISEASE AND OTHER DISEASES OF THE CIRCULATORY SYSTEM: ICD-10-CM

## 2022-04-27 DIAGNOSIS — L73.2 HIDRADENITIS SUPPURATIVA: ICD-10-CM

## 2022-04-27 DIAGNOSIS — Z80.3 FAMILY HISTORY OF MALIGNANT NEOPLASM OF BREAST: ICD-10-CM

## 2022-04-27 DIAGNOSIS — L02.93 CARBUNCLE, UNSPECIFIED: ICD-10-CM

## 2022-04-27 DIAGNOSIS — Z83.3 FAMILY HISTORY OF DIABETES MELLITUS: ICD-10-CM

## 2022-04-27 DIAGNOSIS — D64.9 ANEMIA, UNSPECIFIED: ICD-10-CM

## 2022-04-27 DIAGNOSIS — N80.0 ENDOMETRIOSIS OF UTERUS: ICD-10-CM

## 2022-04-27 DIAGNOSIS — Z84.1 FAMILY HISTORY OF DISORDERS OF KIDNEY AND URETER: ICD-10-CM

## 2022-04-27 DIAGNOSIS — Z78.9 OTHER SPECIFIED HEALTH STATUS: ICD-10-CM

## 2022-04-27 DIAGNOSIS — Z82.3 FAMILY HISTORY OF STROKE: ICD-10-CM

## 2022-04-27 LAB
A1C WITH ESTIMATED AVERAGE GLUCOSE RESULT: 10.9 % — HIGH (ref 4–5.6)
BASOPHILS # BLD AUTO: 0.05 K/UL — SIGNIFICANT CHANGE UP (ref 0–0.2)
BASOPHILS NFR BLD AUTO: 0.5 % — SIGNIFICANT CHANGE UP (ref 0–2)
CHOLEST SERPL-MCNC: 160 MG/DL — SIGNIFICANT CHANGE UP
EOSINOPHIL # BLD AUTO: 0.2 K/UL — SIGNIFICANT CHANGE UP (ref 0–0.5)
EOSINOPHIL NFR BLD AUTO: 2.2 % — SIGNIFICANT CHANGE UP (ref 0–6)
ESTIMATED AVERAGE GLUCOSE: 266 — SIGNIFICANT CHANGE UP
FERRITIN SERPL-MCNC: 19 NG/ML — SIGNIFICANT CHANGE UP (ref 15–150)
HCT VFR BLD CALC: 29.8 % — LOW (ref 34.5–45)
HDLC SERPL-MCNC: 48 MG/DL — LOW
HGB BLD-MCNC: 8.4 G/DL — LOW (ref 11.5–15.5)
HIV 1+2 AB+HIV1 P24 AG SERPL QL IA: SIGNIFICANT CHANGE UP
IANC: 5.94 K/UL — SIGNIFICANT CHANGE UP (ref 1.8–7.4)
IMM GRANULOCYTES NFR BLD AUTO: 1.1 % — SIGNIFICANT CHANGE UP (ref 0–1.5)
IRON SATN MFR SERPL: 11 % — LOW (ref 14–50)
IRON SATN MFR SERPL: 36 UG/DL — SIGNIFICANT CHANGE UP (ref 30–160)
LIPID PNL WITH DIRECT LDL SERPL: 80 MG/DL — SIGNIFICANT CHANGE UP
LYMPHOCYTES # BLD AUTO: 2.21 K/UL — SIGNIFICANT CHANGE UP (ref 1–3.3)
LYMPHOCYTES # BLD AUTO: 24.3 % — SIGNIFICANT CHANGE UP (ref 13–44)
MCHC RBC-ENTMCNC: 21.1 PG — LOW (ref 27–34)
MCHC RBC-ENTMCNC: 28.2 GM/DL — LOW (ref 32–36)
MCV RBC AUTO: 74.7 FL — LOW (ref 80–100)
MONOCYTES # BLD AUTO: 0.6 K/UL — SIGNIFICANT CHANGE UP (ref 0–0.9)
MONOCYTES NFR BLD AUTO: 6.6 % — SIGNIFICANT CHANGE UP (ref 2–14)
NEUTROPHILS # BLD AUTO: 5.94 K/UL — SIGNIFICANT CHANGE UP (ref 1.8–7.4)
NEUTROPHILS NFR BLD AUTO: 65.3 % — SIGNIFICANT CHANGE UP (ref 43–77)
NON HDL CHOLESTEROL: 112 MG/DL — SIGNIFICANT CHANGE UP
NRBC # BLD: 0 /100 WBCS — SIGNIFICANT CHANGE UP
NRBC # FLD: 0 K/UL — SIGNIFICANT CHANGE UP
PLATELET # BLD AUTO: 393 K/UL — SIGNIFICANT CHANGE UP (ref 150–400)
RBC # BLD: 3.99 M/UL — SIGNIFICANT CHANGE UP (ref 3.8–5.2)
RBC # FLD: 18.5 % — HIGH (ref 10.3–14.5)
TIBC SERPL-MCNC: 314 UG/DL — SIGNIFICANT CHANGE UP (ref 220–430)
TRIGL SERPL-MCNC: 161 MG/DL — HIGH
UIBC SERPL-MCNC: 278 UG/DL — SIGNIFICANT CHANGE UP (ref 110–370)
WBC # BLD: 9.1 K/UL — SIGNIFICANT CHANGE UP (ref 3.8–10.5)
WBC # FLD AUTO: 9.1 K/UL — SIGNIFICANT CHANGE UP (ref 3.8–10.5)

## 2022-04-27 PROCEDURE — 83020 HEMOGLOBIN ELECTROPHORESIS: CPT | Mod: 26

## 2022-04-27 PROCEDURE — 99203 OFFICE O/P NEW LOW 30 MIN: CPT | Mod: GE

## 2022-04-27 RX ORDER — FERROUS SULFATE 325(65) MG
325 (65 FE) TABLET ORAL DAILY
Qty: 1 | Refills: 11 | Status: ACTIVE | COMMUNITY
Start: 2022-04-27 | End: 1900-01-01

## 2022-04-28 DIAGNOSIS — Z01.419 ENCOUNTER FOR GYNECOLOGICAL EXAMINATION (GENERAL) (ROUTINE) WITHOUT ABNORMAL FINDINGS: ICD-10-CM

## 2022-04-28 DIAGNOSIS — R10.2 PELVIC AND PERINEAL PAIN: ICD-10-CM

## 2022-04-28 DIAGNOSIS — N80.0 ENDOMETRIOSIS OF UTERUS: ICD-10-CM

## 2022-04-28 LAB
CMV IGG FLD QL: <0.2 U/ML — SIGNIFICANT CHANGE UP
CMV IGG SERPL-IMP: NEGATIVE — SIGNIFICANT CHANGE UP
CMV IGM FLD-ACNC: <8 AU/ML — SIGNIFICANT CHANGE UP
CMV IGM SERPL QL: NEGATIVE — SIGNIFICANT CHANGE UP
HBV SURFACE AG SER-ACNC: SIGNIFICANT CHANGE UP
HCV AB S/CO SERPL IA: 0.11 S/CO — SIGNIFICANT CHANGE UP (ref 0–0.99)
HCV AB SERPL-IMP: SIGNIFICANT CHANGE UP
HPV HIGH+LOW RISK DNA PNL CVX: SIGNIFICANT CHANGE UP
T PALLIDUM AB TITR SER: NEGATIVE — SIGNIFICANT CHANGE UP

## 2022-04-28 RX ORDER — FERROUS SULFATE 325(65) MG
325 (65 FE) TABLET ORAL TWICE DAILY
Qty: 60 | Refills: 3 | Status: ACTIVE | COMMUNITY
Start: 2022-04-28 | End: 1900-01-01

## 2022-04-29 LAB
C TRACH RRNA SPEC QL NAA+PROBE: SIGNIFICANT CHANGE UP
HEMOGLOBIN INTERPRETATION: SIGNIFICANT CHANGE UP
HGB A MFR BLD: 97.1 % — SIGNIFICANT CHANGE UP
HGB A2 MFR BLD: 2.2 % — LOW (ref 2.4–3.5)
HGB F MFR BLD: <1 % — SIGNIFICANT CHANGE UP (ref 0–1.5)
N GONORRHOEA RRNA SPEC QL NAA+PROBE: SIGNIFICANT CHANGE UP
SPECIMEN SOURCE: SIGNIFICANT CHANGE UP

## 2022-04-30 LAB — CYTOLOGY SPEC DOC CYTO: SIGNIFICANT CHANGE UP

## 2022-05-01 PROBLEM — N80.0 ADENOMYOSIS: Status: ACTIVE | Noted: 2022-04-27

## 2022-05-01 NOTE — PHYSICAL EXAM
[Appropriately responsive] : appropriately responsive [Soft] : soft [Non-tender] : non-tender [Non-distended] : non-distended [Examination Of The Breasts] : a normal appearance [No Masses] : no breast masses were palpable [Labia Majora] : normal [Labia Minora] : normal [Normal] : normal [FreeTextEntry2] : obese [FreeTextEntry7] : upper chest/abdomen with scar tissue from boils [FreeTextEntry1] : Left labia majora with folliculitis, distinct 1cm area with swelling/erythema and purulent drainage [FreeTextEntry6] : difficult to palpate 2/2 habitus

## 2022-05-01 NOTE — HISTORY OF PRESENT ILLNESS
[FreeTextEntry1] : 31 yo G0, LMP 4/1 presenting as a new patient s/p ED visit for pelvic pain. Pt initially presented to ED on 4/6/22 with new onset pelvic pain. At that time she was recently s/p menses. Reports cramping pain before, during, and after menses that she had not previously experienced. Labs significant for H/H 7.8/26.2. TVUS obtained significant for likely adenomyosis. Patient received pain medication and was sent home with outpatient follow-up. Patient reports pain improving pain but it is getting worse the past few days because she is anticipating her period. She takes Aleve for the pain with improvement. Otherwise reports monthly menses with 3-4 days of bleeding. She is sexually active and monogamous with female partner. Denies weakness, fatigue, pain between menses.\par \par TVUS (4/6): \par Uterus: 16.7 cm x 9.1 cm x 9.6 cm. Again noted, enlarged, heterogeneous uterus with linear striation, suggesting adenomyosis. A 4.6 x 4.6 x 5.5 cm heterogeneous hypoechoic area in the posterior uterine.\par Endometrium: 8 mm.\par Cervix: A 2.0 x 0.9 x 1.6 cm heterogeneous area with increased vascularity at the endocervical region.\par Right ovary: Visualized transabdominally. 3.5 cm x 1.9 cm x 2.8 cm. Grossly unremarkable. Arterial/venous flow present.\par Left ovary: Visualized transabdominally. 2.3 cm x 1.5 cm x 1.9 cm. Question small follicle. Arterial/venous flow present.\par Fluid: None.\par IMPRESSION:\par Findings suggestive of diffuse adenomyosis. Heterogeneous hypoechoic area in the posterior uterus, which may be due to focal adenomyosis versus fibroid. Indeterminate structure at the endocervical canal. Differential diagnosis includes polyp and submucosal fibroid. Follow-up MRI may be obtained for further evaluation.\par \par Obhx: G0\par Gynhx: fibroids/adenomyosis as above; denies history of cysts; has never been to the GYN and has never had a pap smear; denies history of STIs\par \par PMH: skin boils, hydradenitis with axillary involvement; PRBC at age 18 2/2 heavy menses\par PSH: denies\par Social: Reports current depression; denies medication or therapy for treatment and not interested at this time; denies drug/alcohol/smoking use; monogamous with female partner\par Meds: Aleve PRN\par All: PCN (throat swelling)\par \par

## 2022-05-02 ENCOUNTER — APPOINTMENT (OUTPATIENT)
Dept: INTERNAL MEDICINE | Facility: CLINIC | Age: 32
End: 2022-05-02

## 2022-08-02 NOTE — ED PROVIDER NOTE - NS ED MD DISPO DISCHARGE
Hospitalist Progress Note    NAME: Bernice Hartman :  1954   MRN:  [id number]     HISTORY OF PRESENT ILLNESS:     Nerissa Lyle is a 74 PTA  -Patient moved from [facility] CM - needs SNF/LTC at [place]  -Consult to [address] [facility]- awaiting insurance Auth now  GABRIELA - Care Manager x    Consultant                       x Multidiciplinary team rounds were held today with , nursing, pharmacist and clinical coordinator. Patient's plan of care was discussed; medications were reviewed and discharge planning was addressed.      ________________________________________________________________________  Total NON critical care TIME:  16   Minutes    Total CRITICAL CARE TIME Spent:   Minutes non procedure based      Comments   >50% of visit spent in counseling and coordination of care x    ________________________________________________________________________  Kameron Loaiza Gómez Antonio MD Home

## 2024-03-18 NOTE — ED ADULT NURSE REASSESSMENT NOTE - NS ED NURSE REASSESS COMMENT FT1
Multiple RN's unable to obtain IV access. As per MD Joiner, pt does not need IV access at this time, will switch medication to PO and IM. Awaiting TVUS. Multiple RN's unable to obtain IV access. As per MD Joiner, pt does not need IV access at this time, will switch medication to PO and IM. Awaiting TVUS. Pt provided warm packs and blanket for comfort. Call bell within reach. Non-skid socks on. [de-identified] : 3-18-24 S/p BMT 12-13-23. Having drainage from right ear. Also some nasal congestion and snoring.  started gtts today. was given oral abx for "sinus" infections.  no apneas or pauses.  11-7-23 Get is a 6 month old M with ETD, nasal congestion History of reflux, on famotidine 0.4mL TID  4 ear infections in the last six months, most recent 2 weeks ago, needed IM abx No otorrhea Passed [facility]  +Nasal congestion +Snoring, intemittent and even in character No recent throat infection No bleeding or anesthesia issues

## 2025-03-22 ENCOUNTER — EMERGENCY (EMERGENCY)
Facility: HOSPITAL | Age: 35
LOS: 1 days | Discharge: HOME | End: 2025-03-22
Attending: EMERGENCY MEDICINE
Payer: COMMERCIAL

## 2025-03-22 VITALS
TEMPERATURE: 98 F | OXYGEN SATURATION: 100 % | HEART RATE: 110 BPM | RESPIRATION RATE: 19 BRPM | WEIGHT: 279.99 LBS | DIASTOLIC BLOOD PRESSURE: 81 MMHG | HEIGHT: 64 IN | SYSTOLIC BLOOD PRESSURE: 144 MMHG

## 2025-03-22 PROCEDURE — 99291 CRITICAL CARE FIRST HOUR: CPT

## 2025-03-22 NOTE — ED ADULT TRIAGE NOTE - CHIEF COMPLAINT QUOTE
"Room spinning" dizziness x 2 days. Pt also endorses nausea, abdominal cramping and 4 episodes of vomiting today. History of T2DM.

## 2025-03-23 VITALS
TEMPERATURE: 98 F | HEART RATE: 94 BPM | OXYGEN SATURATION: 100 % | DIASTOLIC BLOOD PRESSURE: 83 MMHG | SYSTOLIC BLOOD PRESSURE: 146 MMHG | RESPIRATION RATE: 18 BRPM

## 2025-03-23 LAB
ALBUMIN SERPL ELPH-MCNC: 3.8 G/DL — SIGNIFICANT CHANGE UP (ref 3.3–5)
ALP SERPL-CCNC: 74 U/L — SIGNIFICANT CHANGE UP (ref 40–120)
ALT FLD-CCNC: 6 U/L — LOW (ref 10–45)
ANION GAP SERPL CALC-SCNC: 15 MMOL/L — SIGNIFICANT CHANGE UP (ref 5–17)
ANISOCYTOSIS BLD QL: SIGNIFICANT CHANGE UP
APTT BLD: 21.6 SEC — LOW (ref 24.5–35.6)
AST SERPL-CCNC: 22 U/L — SIGNIFICANT CHANGE UP (ref 10–40)
BASOPHILS # BLD AUTO: 0 K/UL — SIGNIFICANT CHANGE UP (ref 0–0.2)
BASOPHILS # BLD AUTO: 0.09 K/UL — SIGNIFICANT CHANGE UP (ref 0–0.2)
BASOPHILS NFR BLD AUTO: 0 % — SIGNIFICANT CHANGE UP (ref 0–2)
BASOPHILS NFR BLD AUTO: 0.7 % — SIGNIFICANT CHANGE UP (ref 0–2)
BILIRUB SERPL-MCNC: 0.4 MG/DL — SIGNIFICANT CHANGE UP (ref 0.2–1.2)
BLD GP AB SCN SERPL QL: NEGATIVE — SIGNIFICANT CHANGE UP
BUN SERPL-MCNC: 14 MG/DL — SIGNIFICANT CHANGE UP (ref 7–23)
CALCIUM SERPL-MCNC: 9.2 MG/DL — SIGNIFICANT CHANGE UP (ref 8.4–10.5)
CHLORIDE SERPL-SCNC: 101 MMOL/L — SIGNIFICANT CHANGE UP (ref 96–108)
CO2 SERPL-SCNC: 20 MMOL/L — LOW (ref 22–31)
CREAT SERPL-MCNC: 1.34 MG/DL — HIGH (ref 0.5–1.3)
DACRYOCYTES BLD QL SMEAR: SIGNIFICANT CHANGE UP
EGFR: 54 ML/MIN/1.73M2 — LOW
EGFR: 54 ML/MIN/1.73M2 — LOW
ELLIPTOCYTES BLD QL SMEAR: SLIGHT — SIGNIFICANT CHANGE UP
EOSINOPHIL # BLD AUTO: 0.06 K/UL — SIGNIFICANT CHANGE UP (ref 0–0.5)
EOSINOPHIL # BLD AUTO: 0.24 K/UL — SIGNIFICANT CHANGE UP (ref 0–0.5)
EOSINOPHIL NFR BLD AUTO: 0.5 % — SIGNIFICANT CHANGE UP (ref 0–6)
EOSINOPHIL NFR BLD AUTO: 1.8 % — SIGNIFICANT CHANGE UP (ref 0–6)
GIANT PLATELETS BLD QL SMEAR: PRESENT — SIGNIFICANT CHANGE UP
GLUCOSE SERPL-MCNC: 316 MG/DL — HIGH (ref 70–99)
HCG SERPL-ACNC: <2 MIU/ML — SIGNIFICANT CHANGE UP
HCT VFR BLD CALC: 14.8 % — CRITICAL LOW (ref 34.5–45)
HCT VFR BLD CALC: 25.6 % — LOW (ref 34.5–45)
HCT VFR BLD CALC: 25.6 % — LOW (ref 34.5–45)
HGB BLD-MCNC: 3.6 G/DL — CRITICAL LOW (ref 11.5–15.5)
HGB BLD-MCNC: 7.3 G/DL — LOW (ref 11.5–15.5)
HGB BLD-MCNC: 7.7 G/DL — LOW (ref 11.5–15.5)
HYPOCHROMIA BLD QL: SIGNIFICANT CHANGE UP
IMM GRANULOCYTES NFR BLD AUTO: 0.8 % — SIGNIFICANT CHANGE UP (ref 0–0.9)
INR BLD: 1.11 RATIO — SIGNIFICANT CHANGE UP (ref 0.85–1.16)
LIDOCAIN IGE QN: 26 U/L — SIGNIFICANT CHANGE UP (ref 7–60)
LYMPHOCYTES # BLD AUTO: 1.86 K/UL — SIGNIFICANT CHANGE UP (ref 1–3.3)
LYMPHOCYTES # BLD AUTO: 1.93 K/UL — SIGNIFICANT CHANGE UP (ref 1–3.3)
LYMPHOCYTES # BLD AUTO: 13.9 % — SIGNIFICANT CHANGE UP (ref 13–44)
LYMPHOCYTES # BLD AUTO: 15.1 % — SIGNIFICANT CHANGE UP (ref 13–44)
MACROCYTES BLD QL: SLIGHT — SIGNIFICANT CHANGE UP
MANUAL SMEAR VERIFICATION: SIGNIFICANT CHANGE UP
MCHC RBC-ENTMCNC: 15.7 PG — LOW (ref 27–34)
MCHC RBC-ENTMCNC: 20.3 PG — LOW (ref 27–34)
MCHC RBC-ENTMCNC: 21.4 PG — LOW (ref 27–34)
MCHC RBC-ENTMCNC: 24.3 G/DL — LOW (ref 32–36)
MCHC RBC-ENTMCNC: 28.5 G/DL — LOW (ref 32–36)
MCHC RBC-ENTMCNC: 30.1 G/DL — LOW (ref 32–36)
MCV RBC AUTO: 64.3 FL — LOW (ref 80–100)
MCV RBC AUTO: 71.1 FL — LOW (ref 80–100)
MCV RBC AUTO: 71.3 FL — LOW (ref 80–100)
MICROCYTES BLD QL: SIGNIFICANT CHANGE UP
MONOCYTES # BLD AUTO: 0.23 K/UL — SIGNIFICANT CHANGE UP (ref 0–0.9)
MONOCYTES # BLD AUTO: 0.8 K/UL — SIGNIFICANT CHANGE UP (ref 0–0.9)
MONOCYTES NFR BLD AUTO: 1.7 % — LOW (ref 2–14)
MONOCYTES NFR BLD AUTO: 6.3 % — SIGNIFICANT CHANGE UP (ref 2–14)
MYELOCYTES NFR BLD: 1.7 % — HIGH (ref 0–0)
NEUTROPHILS # BLD AUTO: 10.81 K/UL — HIGH (ref 1.8–7.4)
NEUTROPHILS # BLD AUTO: 9.79 K/UL — HIGH (ref 1.8–7.4)
NEUTROPHILS NFR BLD AUTO: 76.6 % — SIGNIFICANT CHANGE UP (ref 43–77)
NEUTROPHILS NFR BLD AUTO: 80.9 % — HIGH (ref 43–77)
NRBC BLD AUTO-RTO: 0 /100 WBCS — SIGNIFICANT CHANGE UP (ref 0–0)
NRBC BLD AUTO-RTO: 0 /100 WBCS — SIGNIFICANT CHANGE UP (ref 0–0)
OVALOCYTES BLD QL SMEAR: SLIGHT — SIGNIFICANT CHANGE UP
PLAT MORPH BLD: NORMAL — SIGNIFICANT CHANGE UP
PLATELET # BLD AUTO: 397 K/UL — SIGNIFICANT CHANGE UP (ref 150–400)
PLATELET # BLD AUTO: 439 K/UL — HIGH (ref 150–400)
PLATELET # BLD AUTO: 456 K/UL — HIGH (ref 150–400)
POIKILOCYTOSIS BLD QL AUTO: SIGNIFICANT CHANGE UP
POLYCHROMASIA BLD QL SMEAR: SLIGHT — SIGNIFICANT CHANGE UP
POTASSIUM SERPL-MCNC: 5 MMOL/L — SIGNIFICANT CHANGE UP (ref 3.5–5.3)
POTASSIUM SERPL-SCNC: 5 MMOL/L — SIGNIFICANT CHANGE UP (ref 3.5–5.3)
PROT SERPL-MCNC: 7.6 G/DL — SIGNIFICANT CHANGE UP (ref 6–8.3)
PROTHROM AB SERPL-ACNC: 12.6 SEC — SIGNIFICANT CHANGE UP (ref 9.9–13.4)
RBC # BLD: 2.3 M/UL — LOW (ref 3.8–5.2)
RBC # BLD: 3.59 M/UL — LOW (ref 3.8–5.2)
RBC # BLD: 3.6 M/UL — LOW (ref 3.8–5.2)
RBC # FLD: 22.3 % — HIGH (ref 10.3–14.5)
RBC # FLD: 24.9 % — HIGH (ref 10.3–14.5)
RBC # FLD: 25.6 % — HIGH (ref 10.3–14.5)
RBC BLD AUTO: ABNORMAL
RH IG SCN BLD-IMP: POSITIVE — SIGNIFICANT CHANGE UP
SCHISTOCYTES BLD QL AUTO: SIGNIFICANT CHANGE UP
SODIUM SERPL-SCNC: 136 MMOL/L — SIGNIFICANT CHANGE UP (ref 135–145)
STOMATOCYTES BLD QL SMEAR: SIGNIFICANT CHANGE UP
WBC # BLD: 12.77 K/UL — HIGH (ref 3.8–10.5)
WBC # BLD: 13.36 K/UL — HIGH (ref 3.8–10.5)
WBC # BLD: 15.25 K/UL — HIGH (ref 3.8–10.5)
WBC # FLD AUTO: 12.77 K/UL — HIGH (ref 3.8–10.5)
WBC # FLD AUTO: 13.36 K/UL — HIGH (ref 3.8–10.5)
WBC # FLD AUTO: 15.25 K/UL — HIGH (ref 3.8–10.5)

## 2025-03-23 PROCEDURE — 36430 TRANSFUSION BLD/BLD COMPNT: CPT

## 2025-03-23 PROCEDURE — 85730 THROMBOPLASTIN TIME PARTIAL: CPT

## 2025-03-23 PROCEDURE — 93975 VASCULAR STUDY: CPT

## 2025-03-23 PROCEDURE — 86850 RBC ANTIBODY SCREEN: CPT

## 2025-03-23 PROCEDURE — 85025 COMPLETE CBC W/AUTO DIFF WBC: CPT

## 2025-03-23 PROCEDURE — 76856 US EXAM PELVIC COMPLETE: CPT | Mod: 26,59

## 2025-03-23 PROCEDURE — 85610 PROTHROMBIN TIME: CPT

## 2025-03-23 PROCEDURE — 96374 THER/PROPH/DIAG INJ IV PUSH: CPT

## 2025-03-23 PROCEDURE — 86901 BLOOD TYPING SEROLOGIC RH(D): CPT

## 2025-03-23 PROCEDURE — 99285 EMERGENCY DEPT VISIT HI MDM: CPT | Mod: 25

## 2025-03-23 PROCEDURE — 99284 EMERGENCY DEPT VISIT MOD MDM: CPT | Mod: GC

## 2025-03-23 PROCEDURE — 80053 COMPREHEN METABOLIC PANEL: CPT

## 2025-03-23 PROCEDURE — 76856 US EXAM PELVIC COMPLETE: CPT

## 2025-03-23 PROCEDURE — 76830 TRANSVAGINAL US NON-OB: CPT

## 2025-03-23 PROCEDURE — 83690 ASSAY OF LIPASE: CPT

## 2025-03-23 PROCEDURE — 85027 COMPLETE CBC AUTOMATED: CPT

## 2025-03-23 PROCEDURE — 93005 ELECTROCARDIOGRAM TRACING: CPT

## 2025-03-23 PROCEDURE — 83036 HEMOGLOBIN GLYCOSYLATED A1C: CPT

## 2025-03-23 PROCEDURE — P9016: CPT

## 2025-03-23 PROCEDURE — 76830 TRANSVAGINAL US NON-OB: CPT | Mod: 26

## 2025-03-23 PROCEDURE — 93010 ELECTROCARDIOGRAM REPORT: CPT

## 2025-03-23 PROCEDURE — 36415 COLL VENOUS BLD VENIPUNCTURE: CPT

## 2025-03-23 PROCEDURE — 86923 COMPATIBILITY TEST ELECTRIC: CPT

## 2025-03-23 PROCEDURE — 86900 BLOOD TYPING SEROLOGIC ABO: CPT

## 2025-03-23 PROCEDURE — 84702 CHORIONIC GONADOTROPIN TEST: CPT

## 2025-03-23 PROCEDURE — G0378: CPT

## 2025-03-23 PROCEDURE — 93975 VASCULAR STUDY: CPT | Mod: 26

## 2025-03-23 PROCEDURE — 99223 1ST HOSP IP/OBS HIGH 75: CPT

## 2025-03-23 RX ORDER — ACETAMINOPHEN 500 MG/5ML
975 LIQUID (ML) ORAL ONCE
Refills: 0 | Status: COMPLETED | OUTPATIENT
Start: 2025-03-23 | End: 2025-03-23

## 2025-03-23 RX ORDER — KETOROLAC TROMETHAMINE 30 MG/ML
15 INJECTION, SOLUTION INTRAMUSCULAR; INTRAVENOUS ONCE
Refills: 0 | Status: DISCONTINUED | OUTPATIENT
Start: 2025-03-23 | End: 2025-03-23

## 2025-03-23 RX ADMIN — KETOROLAC TROMETHAMINE 15 MILLIGRAM(S): 30 INJECTION, SOLUTION INTRAMUSCULAR; INTRAVENOUS at 04:00

## 2025-03-23 RX ADMIN — Medication 975 MILLIGRAM(S): at 13:08

## 2025-03-23 RX ADMIN — KETOROLAC TROMETHAMINE 15 MILLIGRAM(S): 30 INJECTION, SOLUTION INTRAMUSCULAR; INTRAVENOUS at 03:15

## 2025-03-23 NOTE — ED CDU PROVIDER INITIAL DAY NOTE - OBJECTIVE STATEMENT
This is a 35-year-old female  with a history of heavy menstrual bleeding now coming in with lightheadedness associated with heavy menses. Her last period was 10 March lasted 3 days was fairly heavy resolved and then she developed a few days of very heavy clots with minimal blood.  She also has abdominal pain/pelvic pain/suprapubic pain.  No fevers chills head strike.  When she gets up she is really symptomatic.  She is short of breath and fatigue.  No chest discomfort. last transfusion was when she was 18. takes iron daily. of note patient indicates that she has not seeen a doctor in many years, she does not know her baseline hgb This is a 35-year-old female  with a history of heavy menstrual bleeding now coming in with lightheadedness associated with heavy menses. Her last period was 10 March lasted 3 days was fairly heavy resolved and then she developed a few days of very heavy clots with minimal blood.  She also has abdominal pain/pelvic pain/suprapubic pain.  No fevers chills head strike.  When she gets up she is really symptomatic.  She is short of breath and fatigue.  No chest discomfort. last transfusion was when she was 18. takes iron daily. of note patient indicates that she has not seen a doctor in many years, she does not know her baseline hgb

## 2025-03-23 NOTE — ED PROVIDER NOTE - PHYSICAL EXAMINATION
Patient is awake alert talking and appears in moderate distress.  She is pale.  Tachycardic and regular.  Clear lungs.  Abdomen is mostly soft however in the suprapubic region there is lumpiness presumably to her uterus.  These lumpy areas are quite hard.  Somewhat uncomfortable to palpation.  No leg edema.  Somewhat cool and somewhat toxic.

## 2025-03-23 NOTE — CHART NOTE - NSCHARTNOTEFT_GEN_A_CORE
R2 GYN Progress Note       Patient seen and examined at bedside. Patient reports no longer feeling lightheaded or dizzy since last night, improvement in sx after blood transfusion. Denies vaginal bleeding. Reports mild abdominal cramping. Denies CP, SOB, N/V, fevers, and chills.    Vital Signs Last 24 Hours  T(C): 36.7 (03-23-25 @ 12:40), Max: 37.3 (03-23-25 @ 05:01)  HR: 107 (03-23-25 @ 12:40) (92 - 110)  BP: 134/61 (03-23-25 @ 12:40) (125/58 - 168/73)  RR: 16 (03-23-25 @ 12:40) (15 - 22)  SpO2: 100% (03-23-25 @ 12:40) (99% - 100%)    I&O's Summary      Physical Exam:   General: sitting comfortably in bed, NAD   Lungs: non labored breathing on RA   Abd: Soft, non-distended. Large uterus tender to palpation   :  No bleeding on pad. External labia wnl. Noted Hidradenitis scarring in the groin region.   Ext: non-tender b/l, no edema       Labs:                        7.3    12.77 )-----------( 439      ( 23 Mar 2025 10:29 )             25.6   baso x      eos x      imm gran 0.8    lymph x      mono x      poly x                            3.6    13.36 )-----------( 456      ( 23 Mar 2025 03:22 )             14.8   baso x      eos x      imm gran x      lymph x      mono x      poly x          A/P; A/P: 35y G0 with LMP 3/10 presenting with symptomatic anemia in the setting of heavy vaginal bleeding. Patient without active bleeding at this time. Patient with improvement in vital signs and symptoms after transfusion. TVUS showed adenomyosis and large subserosal fibroid. Plan for outpatient f/u. No acute intervention from GYN at this time.   - Transfusion per ED   - Outpatient follow up with gyn for long term management   - No acute intervention if bleeding stable  - Gyn to follow      d/w Dr. Krishnan,   Stephen Patel, PGY2 R2 GYN Progress Note       Patient seen and examined at bedside. Patient reports no longer feeling lightheaded or dizzy since last night, improvement in sx after blood transfusion. Denies vaginal bleeding. Reports mild abdominal cramping. Denies CP, SOB, N/V, fevers, and chills.    Vital Signs Last 24 Hours  T(C): 36.7 (03-23-25 @ 12:40), Max: 37.3 (03-23-25 @ 05:01)  HR: 107 (03-23-25 @ 12:40) (92 - 110)  BP: 134/61 (03-23-25 @ 12:40) (125/58 - 168/73)  RR: 16 (03-23-25 @ 12:40) (15 - 22)  SpO2: 100% (03-23-25 @ 12:40) (99% - 100%)    I&O's Summary      Physical Exam:   General: sitting comfortably in bed, NAD   Lungs: non labored breathing on RA   Abd: Soft, non-distended. Large uterus tender to palpation   :  No bleeding on pad. External labia wnl. Noted Hidradenitis scarring in the groin region.   Ext: non-tender b/l, no edema       Labs:                        7.3    12.77 )-----------( 439      ( 23 Mar 2025 10:29 )             25.6   baso x      eos x      imm gran 0.8    lymph x      mono x      poly x                            3.6    13.36 )-----------( 456      ( 23 Mar 2025 03:22 )             14.8   baso x      eos x      imm gran x      lymph x      mono x      poly x          A/P: 35y G0 with LMP 3/10 presenting with symptomatic anemia in the setting of heavy vaginal bleeding. Patient without active bleeding at this time. Patient with improvement in vital signs and symptoms after transfusion. TVUS showed adenomyosis and large subserosal fibroid. Plan for outpatient f/u. No acute intervention from GYN at this time.   - Transfusion per ED   - Outpatient follow up with gyn for long term management   - No acute intervention if bleeding stable  - Gyn to follow      d/w Stephen Burnett, PGY2      Attending Note     Patient discussed with resident. Currently receiving blood transfusion for symptomatic anemia (Hb 3g/dL) due to vaginal bleeding- uterine fibroids. Patient reports feeling well at this time without further vaginal bleeding.   Vitals reviewed   Reviewed labs and agree with assessment and plan   -GYN to continue to follow   -no acute interventions or medications required unless bleeding again   -patient to follow up in office for continued management     MARIANGEL Krishnan

## 2025-03-23 NOTE — ED CDU PROVIDER DISPOSITION NOTE - CARE PROVIDER_API CALL
[place] Krishnan  Obstetrics and Gynecology  [address] [place] [address]  Phone: [phone]  Fax: [phone]  Follow Up Time:

## 2025-03-23 NOTE — ED CDU PROVIDER INITIAL DAY NOTE - CLINICAL SUMMARY MEDICAL DECISION MAKING FREE TEXT BOX
Adult female with a history of menorrhagia last transfused  age 18 presented to ER with symptomatic anemia menorrhagia hemoglobin 3.1.  Transfused 3 PRBCs feels improved but still symptomatic.  CDU for additional transfusion, recheck and final OB/GYN recommendations for definitive care.\  Hiram Edmond MD, Facep

## 2025-03-23 NOTE — ED ADULT NURSE NOTE - OBJECTIVE STATEMENT
35y F A&O x3 coming in from home. Pmhx DM2. Presenting to the ER with lower abdominal cramping approx. 1 week. LMP 3/17. Per pt she began passing clots of blood and going through pad from Tuesday to Thursday "I went through 4 packs of pas in 3 days". Pt at this time endorsing dizziness when standing and when sitting down says it feels like the room is spinning and as well random episodes of SOB. During this pt also endorsing decreased PO intake.  Pt denies c/p, fevers, chills cough, congestion, numbness, tingling.

## 2025-03-23 NOTE — ED CDU PROVIDER DISPOSITION NOTE - CLINICAL COURSE
35y G0 with LMP 3/10 presenting with symptomatic anemia in the setting of heavy vaginal bleeding. Patient with history of AUB, likely due to adenomyosis. patient with symptomatic anemia hgb 3.6. patient received 3units in ED and hgb raised to 7.3. patient reported much improvement but still endorsed some lightheadedness while walking to the bathroom. ED discussed with OB who will come reevaluate the patient, will place in CDU for an additional unit to attempt to better optimize the patient for definitive management. 35y G0 with LMP 3/10 presenting with symptomatic anemia in the setting of heavy vaginal bleeding. Patient with history of AUB, likely due to adenomyosis. patient with symptomatic anemia hgb 3.6. patient received 3units in ED and hgb raised to 7.3. patient reported much improvement but still endorsed some lightheadedness while walking to the bathroom. ED discussed with OB who will come reevaluate the patient, will place in CDU for an additional unit to attempt to better optimize the patient for definitive management.   patient feeling substantially better after transfusion with no active bleeding. patient reevaluated by ob with plan for outpatient surgical management. patient comfortable with the plan. seen by Dr. Mina

## 2025-03-23 NOTE — ED ADULT NURSE NOTE - HOW OFTEN DO YOU HAVE A DRINK CONTAINING ALCOHOL?
[General Appearance - Well Nourished] : well nourished Never [General Appearance - Well Developed] : well developed [Normal Appearance] : normal appearance [Well Groomed] : well groomed [General Appearance - In No Acute Distress] : no acute distress [Abdomen Soft] : soft [Abdomen Tenderness] : non-tender [Costovertebral Angle Tenderness] : no ~M costovertebral angle tenderness [Urethral Meatus] : meatus normal [Penis Abnormality] : normal circumcised penis [Urinary Bladder Findings] : the bladder was normal on palpation [Scrotum] : the scrotum was normal [Epididymis] : the epididymides were normal [Testes Tenderness] : no tenderness of the testes [Testes Mass (___cm)] : there were no testicular masses [Prostate Tenderness] : the prostate was not tender [No Prostate Nodules] : no prostate nodules [Prostate Size ___ gm] : prostate size [unfilled] gm [FreeTextEntry1] : deferred today [Edema] : no peripheral edema [] : no respiratory distress [Exaggerated Use Of Accessory Muscles For Inspiration] : no accessory muscle use [Respiration, Rhythm And Depth] : normal respiratory rhythm and effort [Oriented To Time, Place, And Person] : oriented to person, place, and time [Affect] : the affect was normal [Mood] : the mood was normal [Not Anxious] : not anxious [Normal Station and Gait] : the gait and station were normal for the patient's age [No Focal Deficits] : no focal deficits [No Palpable Adenopathy] : no palpable adenopathy

## 2025-03-23 NOTE — ED ADULT NURSE NOTE - SUICIDE SCREENING QUESTION 3
"  [id number]  pdf   DEVICE TYPE CRT-D       Cardiac Electrophysiology Report [id number] 05:07PM Parvin Joseph     Test Name Result Flag Reference   Cardiac Electrophysiology Report      [id number]  pdf     Signatures   Electronically signed by : Merry Fong, ; Oct  6 2017  1:36PM EST                       (Author)    Electronically signed by : CARA Sun ; Oct  8 2017  8:59PM EST                       (Author)    " No

## 2025-03-23 NOTE — ED ADULT NURSE NOTE - NSFALLRISKINTERV_ED_ALL_ED

## 2025-03-23 NOTE — CONSULT NOTE ADULT - CONSULT REASON
BHUMIKA O-Z Plasty Text: The defect edges were debeveled with a #15 scalpel blade.  Given the location of the defect, shape of the defect and the proximity to free margins an O-Z plasty (double transposition flap) was deemed most appropriate.  Using a sterile surgical marker, the appropriate transposition flaps were drawn incorporating the defect and placing the expected incisions within the relaxed skin tension lines where possible.    The area thus outlined was incised deep to adipose tissue with a #15 scalpel blade.  The skin margins were undermined to an appropriate distance in all directions utilizing iris scissors.  Hemostasis was achieved with electrocautery.  The flaps were then transposed into place, one clockwise and the other counterclockwise, and anchored with interrupted buried subcutaneous sutures.

## 2025-03-23 NOTE — ED CDU PROVIDER DISPOSITION NOTE - NSFOLLOWUPINSTRUCTIONS_ED_ALL_ED_FT
Follow up with OB/GYN in 1 week to discuss definitive management  Bring a copy of your test results with you to your appointment  Continue your current medication regimen including iron supplementation  Return to the Emergency Room if you experience new or worsening symptoms      Anemia    Anemia is a condition in which the concentration of red blood cells or hemoglobin in the blood is below normal. Hemoglobin is a substance in red blood cells that carries oxygen to the tissues of the body. Anemia results in not enough oxygen reaching these tissues which can cause symptoms such as weakness, dizziness/lightheadedness, shortness of breath, chest pain, paleness, or nausea. The cause of your anemia may or may not be determined immediately. If your hemoglobin was dangerously low, you may have received a blood transfusion. Usually reactions to transfusions occur immediately but monitor yourself for any fevers, rash, or shortness of breath.    SEEK IMMEDIATE MEDICAL CARE IF YOU HAVE ANY OF THE FOLLOWING SYMPTOMS: extreme weakness/chest pain/shortness of breath, black or bloody stools, vomiting blood, fainting, fever, or any signs of dehydration. Follow up with OB/GYN in 1 week to discuss definitive management  Thierry Burnettison   Obstetrics and Gynecology  [address] [place] [id number]  Phone: [phone]  Fax: [phone]    Bring a copy of your test results with you to your appointment    Continue your current medication regimen including iron supplementation    Return to the Emergency Room if you experience new or worsening symptoms      Anemia  Anemia is a condition in which the concentration of red blood cells or hemoglobin in the blood is below normal. Hemoglobin is a substance in red blood cells that carries oxygen to the tissues of the body. Anemia results in not enough oxygen reaching these tissues which can cause symptoms such as weakness, dizziness/lightheadedness, shortness of breath, chest pain, paleness, or nausea. The cause of your anemia may or may not be determined immediately. If your hemoglobin was dangerously low, you may have received a blood transfusion. Usually reactions to transfusions occur immediately but monitor yourself for any fevers, rash, or shortness of breath.    SEEK IMMEDIATE MEDICAL CARE IF YOU HAVE ANY OF THE FOLLOWING SYMPTOMS: extreme weakness/chest pain/shortness of breath, black or bloody stools, vomiting blood, fainting, fever, or any signs of dehydration. Follow up with OB/GYN in 1 week to discuss definitive management  Thierry Burnettison   Obstetrics and Gynecology  [address] [place] [id number]  Phone: [phone]  Fax: [phone]    Bring a copy of your test results with you to your appointment    Continue your current medication regimen including iron supplementation    Return to the Emergency Room if you experience new or worsening symptoms    You should also see a primary doctor because your kidney function is mildly abnormal      Anemia  Anemia is a condition in which the concentration of red blood cells or hemoglobin in the blood is below normal. Hemoglobin is a substance in red blood cells that carries oxygen to the tissues of the body. Anemia results in not enough oxygen reaching these tissues which can cause symptoms such as weakness, dizziness/lightheadedness, shortness of breath, chest pain, paleness, or nausea. The cause of your anemia may or may not be determined immediately. If your hemoglobin was dangerously low, you may have received a blood transfusion. Usually reactions to transfusions occur immediately but monitor yourself for any fevers, rash, or shortness of breath.    SEEK IMMEDIATE MEDICAL CARE IF YOU HAVE ANY OF THE FOLLOWING SYMPTOMS: extreme weakness/chest pain/shortness of breath, black or bloody stools, vomiting blood, fainting, fever, or any signs of dehydration. Follow up with OB/GYN in 1 week to discuss definitive management  Thierry Burnettison   Obstetrics and Gynecology  [address] [place] [id number]  Phone: [phone]  Fax: [phone]    Bring a copy of your test results with you to your appointment    Continue your current medication regimen including iron supplementation    Return to the Emergency Room if you experience new or worsening symptoms    You should also see a primary doctor because your kidney function is mildly abnormal, additionally your sugar was quite elevated, your a1c was sent and we will call you with the results. It was recommended you take metformin but since you prefer to establish care work on dietary changes      Anemia  Anemia is a condition in which the concentration of red blood cells or hemoglobin in the blood is below normal. Hemoglobin is a substance in red blood cells that carries oxygen to the tissues of the body. Anemia results in not enough oxygen reaching these tissues which can cause symptoms such as weakness, dizziness/lightheadedness, shortness of breath, chest pain, paleness, or nausea. The cause of your anemia may or may not be determined immediately. If your hemoglobin was dangerously low, you may have received a blood transfusion. Usually reactions to transfusions occur immediately but monitor yourself for any fevers, rash, or shortness of breath.    SEEK IMMEDIATE MEDICAL CARE IF YOU HAVE ANY OF THE FOLLOWING SYMPTOMS: extreme weakness/chest pain/shortness of breath, black or bloody stools, vomiting blood, fainting, fever, or any signs of dehydration.

## 2025-03-23 NOTE — ED ADULT NURSE REASSESSMENT NOTE - NS ED NURSE REASSESS COMMENT FT1
0504. 1 unit PRBC given. Consent in chart. Risks and benefits explained to patient. Patient verbalized understanding of risks and benefits. Patient aware of possible side effects. Vital signs stable. Second RN at bedside for confirmation.

## 2025-03-23 NOTE — ED CDU PROVIDER INITIAL DAY NOTE - ATTENDING APP SHARED VISIT CONTRIBUTION OF CARE
I have personally performed a face to face diagnostic evaluation on this patient.  I have reviewed the ACP note and agree with the history, exam, and plan of care, except as noted.  History and Exam by me shows  See ED provider note  Hiram Edmond MD, Facep

## 2025-03-23 NOTE — CONSULT NOTE ADULT - SUBJECTIVE AND OBJECTIVE BOX
JACOB WASHINGTON  35y  Female [id number]    HPI: 35y G0 with LMP presenting with symptomatic anemia in the setting of heavy vaginal bleeding. Patient with history of AUB, likely due to adenomyosis. Patient notes this episodes, she was bleeding through 10?? pads an hour over the past few days. Bleeding has since decreased. Patient was seen once at [facility] with no further follow up at that time.       Name of GYN Physician:     ObHx: G0  GynHx:  AUB likely adenomyosis Denies fibroids, cysts, endometriosis, STI's, Abnormal pap smears   PMHx: T2DM   SurgHx:  Meds:   Allergies: PNC (throat closing)   Social History:  Denies smoking use, drug use, alcohol use.    Vital Signs Last 24 Hrs  T(C): 36.6 (23 Mar 2025 04:29), Max: 36.9 (23 Mar 2025 04:09)  T(F): 97.8 (23 Mar 2025 04:29), Max: 98.5 (23 Mar 2025 04:09)  HR: 97 (23 Mar 2025 04:29) (97 - 110)  BP: 136/61 (23 Mar 2025 04:29) (136/61 - 168/73)  BP(mean): --  RR: 22 (23 Mar 2025 04:29) (19 - 22)  SpO2: 100% (23 Mar 2025 04:29) (100% - 100%)    Parameters below as of 23 Mar 2025 04:29  Patient On (Oxygen Delivery Method): room air        Physical Exam:   General: sitting comfortably in bed, NAD   HEENT: neck supple, full ROM  CV: RR S1S2 no m/r/g  Lungs: CTA b/l, good air flow b/l   Back: No CVA tenderness  Abd: Soft, non-tender, non-distended.  Bowel sounds present.    :  No bleeding on pad. External labia wnl. Bimanual exam with no cervical motion tenderness, uterus wnl, adnexa non palpable b/l.  Cervix closed vs. Cervix dilated *** cm   Speculum Exam: No active bleeding from os. Physiologic discharge.    Ext: non-tender b/l, no edema     Chaperoned by     LABS:                              3.6    13.36 )-----------( 456      ( 23 Mar 2025 03:22 )             14.8     03-23    136  |  101  |  14  ----------------------------<  316[H]  5.0   |  20[L]  |  1.34[H]    Ca    9.2      23 Mar 2025 03:22    TPro  7.6  /  Alb  3.8  /  TBili  0.4  /  DBili  x   /  AST  22  /  ALT  6[L]  /  AlkPhos  74  03-23    I&O's Detail      Urinalysis Basic - ( 23 Mar 2025 03:22 )    Color: x / Appearance: x / SG: x / pH: x  Gluc: 316 mg/dL / Ketone: x  / Bili: x / Urobili: x   Blood: x / Protein: x / Nitrite: x   Leuk Esterase: x / RBC: x / WBC x   Sq Epi: x / Non Sq Epi: x / Bacteria: x        RADIOLOGY & ADDITIONAL STUDIES: JACOB RODRIGUEZ  35y  Female [id number]    HPI: 35y G0 with LMP 3/10 presenting with symptomatic anemia in the setting of heavy vaginal bleeding. Patient with history of AUB, likely due to adenomyosis. Patient with regular, 3 day periods where she uses 8-10 pads/ day. She notes regular period followed by episode of heavy bleeding where she was soaking through pads with golf ball sized clots. Bleeding stopped yesterday with some continued abdominal cramping.   Patient was seen once at [facility] with no further follow up at that time.       Name of GYN Physician: [facility]     ObHx: G0  GynHx:  AUB likely adenomyosis Denies fibroids, cysts, endometriosis, STI's, Abnormal pap smears   PMHx: T2DM, Hidradenitis   SurgHx: Denies   Meds: Denies   Allergies: PNC (throat closing)   Social History:  Denies smoking use, drug use, alcohol use.    Vital Signs Last 24 Hrs  T(C): 36.6 (23 Mar 2025 04:29), Max: 36.9 (23 Mar 2025 04:09)  T(F): 97.8 (23 Mar 2025 04:29), Max: 98.5 (23 Mar 2025 04:09)  HR: 97 (23 Mar 2025 04:29) (97 - 110)  BP: 136/61 (23 Mar 2025 04:29) (136/61 - 168/73)  BP(mean): --  RR: 22 (23 Mar 2025 04:29) (19 - 22)  SpO2: 100% (23 Mar 2025 04:29) (100% - 100%)    Parameters below as of 23 Mar 2025 04:29  Patient On (Oxygen Delivery Method): room air        Physical Exam:   General: sitting comfortably in bed, NAD   HEENT: neck supple, full ROM  CV: RR S1S2 no m/r/g  Lungs: CTA b/l, good air flow b/l   Back: No CVA tenderness  Abd: Soft, non-tender, non-distended.  Bowel sounds present.    :  No bleeding on pad. External labia wnl. Bimanual exam with no cervical motion tenderness, uterus wnl, adnexa non palpable b/l.  Cervix closed vs. Cervix dilated *** cm   Speculum Exam: No active bleeding from os. Physiologic discharge.    Ext: non-tender b/l, no edema     Chaperoned by     LABS:                              3.6    13.36 )-----------( 456      ( 23 Mar 2025 03:22 )             14.8     03-23    136  |  101  |  14  ----------------------------<  316[H]  5.0   |  20[L]  |  1.34[H]    Ca    9.2      23 Mar 2025 03:22    TPro  7.6  /  Alb  3.8  /  TBili  0.4  /  DBili  x   /  AST  22  /  ALT  6[L]  /  AlkPhos  74  03-23    I&O's Detail      Urinalysis Basic - ( 23 Mar 2025 03:22 )    Color: x / Appearance: x / SG: x / pH: x  Gluc: 316 mg/dL / Ketone: x  / Bili: x / Urobili: x   Blood: x / Protein: x / Nitrite: x   Leuk Esterase: x / RBC: x / WBC x   Sq Epi: x / Non Sq Epi: x / Bacteria: x        RADIOLOGY & ADDITIONAL STUDIES: JACOB WASHINGTON  35y  Female [id number]    HPI: 35y G0 with LMP 3/10 presenting with symptomatic anemia in the setting of heavy vaginal bleeding. Patient with history of AUB, likely due to adenomyosis. Patient with regular, 3 day periods where she uses 8-10 pads/ day. She notes regular period followed by episode of heavy bleeding where she was soaking through pads with golf ball sized clots. Bleeding stopped yesterday with some continued abdominal cramping. She presented to ED due to symptomatic anemia with dizziness and lightheadedness with ambulation.  Patient was seen once at [facility] with not further follow up.       Name of GYN Physician: [facility]     ObHx: G0  GynHx:  AUB likely adenomyosis Denies fibroids, cysts, endometriosis, STI's, Abnormal pap smears   PMHx: T2DM, Hidradenitis   SurgHx: Denies   Meds: Denies   Allergies: PNC (throat closing)   Social History:  Denies smoking use, drug use, alcohol use.    Vital Signs Last 24 Hrs  T(C): 36.6 (23 Mar 2025 04:29), Max: 36.9 (23 Mar 2025 04:09)  T(F): 97.8 (23 Mar 2025 04:29), Max: 98.5 (23 Mar 2025 04:09)  HR: 97 (23 Mar 2025 04:29) (97 - 110)  BP: 136/61 (23 Mar 2025 04:29) (136/61 - 168/73)  BP(mean): --  RR: 22 (23 Mar 2025 04:29) (19 - 22)  SpO2: 100% (23 Mar 2025 04:29) (100% - 100%)    Parameters below as of 23 Mar 2025 04:29  Patient On (Oxygen Delivery Method): room air        Physical Exam:   General: sitting comfortably in bed, NAD   Lungs: non labored breathing on RA   Abd: Soft, non-distended.  Bowel sounds present large abdominal mass (possibly uterus), tender to palpation   :  No bleeding on pad. External labia wnl. Noted Hidradenitis scarring in the groin region. Bimanual exam with no cervical motion tenderness, adnexa non palpable b/l. 22 week sized uterus deviated toward the left, minimal fundal tenderness   Speculum Exam: Limited due to patient body habitus.  No active bleeding from os. Physiologic brown discharge. Limited visualization of cervix due to body habitus, smooth to palpation   Ext: non-tender b/l, no edema     Chaperoned by YIMI García RN    LABS:                              3.6    13.36 )-----------( 456      ( 23 Mar 2025 03:22 )             14.8     03-23    136  |  101  |  14  ----------------------------<  316[H]  5.0   |  20[L]  |  1.34[H]    Ca    9.2      23 Mar 2025 03:22    TPro  7.6  /  Alb  3.8  /  TBili  0.4  /  DBili  x   /  AST  22  /  ALT  6[L]  /  AlkPhos  74  03-23    I&O's Detail      Urinalysis Basic - ( 23 Mar 2025 03:22 )    Color: x / Appearance: x / SG: x / pH: x  Gluc: 316 mg/dL / Ketone: x  / Bili: x / Urobili: x   Blood: x / Protein: x / Nitrite: x   Leuk Esterase: x / RBC: x / WBC x   Sq Epi: x / Non Sq Epi: x / Bacteria: x        RADIOLOGY & ADDITIONAL STUDIES: JACOB WASHINGTON  35y  Female [id number]    HPI: 35y G0 with LMP 3/10 presenting with symptomatic anemia in the setting of heavy vaginal bleeding. Patient with history of AUB, likely due to adenomyosis. Patient with regular, 3 day periods where she uses 8-10 pads/ day. She notes regular period followed by episode of heavy bleeding where she was soaking through pads with golf ball sized clots. Bleeding stopped yesterday with some continued abdominal cramping. She presented to ED due to symptomatic anemia with dizziness and lightheadedness with ambulation.  Patient was seen once at [facility] with not further follow up.       Name of GYN Physician: [facility]     ObHx: G0  GynHx:  AUB likely adenomyosis Denies fibroids, cysts, endometriosis, STI's, Abnormal pap smears   PMHx: T2DM, Hidradenitis   SurgHx: Denies   Meds: Denies   Allergies: PNC (throat closing)   Social History:  Denies smoking use, drug use, alcohol use.    Vital Signs Last 24 Hrs  T(C): 36.6 (23 Mar 2025 04:29), Max: 36.9 (23 Mar 2025 04:09)  T(F): 97.8 (23 Mar 2025 04:29), Max: 98.5 (23 Mar 2025 04:09)  HR: 97 (23 Mar 2025 04:29) (97 - 110)  BP: 136/61 (23 Mar 2025 04:29) (136/61 - 168/73)  BP(mean): --  RR: 22 (23 Mar 2025 04:29) (19 - 22)  SpO2: 100% (23 Mar 2025 04:29) (100% - 100%)    Parameters below as of 23 Mar 2025 04:29  Patient On (Oxygen Delivery Method): room air        Physical Exam:   General: sitting comfortably in bed, NAD   Lungs: non labored breathing on RA   Abd: Soft, non-distended.  Bowel sounds present large abdominal mass (possibly uterus), tender to palpation   :  No bleeding on pad. External labia wnl. Noted Hidradenitis scarring in the groin region. Bimanual exam with no cervical motion tenderness, adnexa non palpable b/l. 22 week sized uterus deviated toward the left, minimal fundal tenderness   Speculum Exam: Limited due to patient body habitus.  No active bleeding from os. Physiologic brown discharge. Limited visualization of cervix due to body habitus. , smooth to palpation   Ext: non-tender b/l, no edema     Chaperoned by YIMI García RN    LABS:                              3.6    13.36 )-----------( 456      ( 23 Mar 2025 03:22 )             14.8     03-23    136  |  101  |  14  ----------------------------<  316[H]  5.0   |  20[L]  |  1.34[H]    Ca    9.2      23 Mar 2025 03:22    TPro  7.6  /  Alb  3.8  /  TBili  0.4  /  DBili  x   /  AST  22  /  ALT  6[L]  /  AlkPhos  74  03-23    I&O's Detail      Urinalysis Basic - ( 23 Mar 2025 03:22 )    Color: x / Appearance: x / SG: x / pH: x  Gluc: 316 mg/dL / Ketone: x  / Bili: x / Urobili: x   Blood: x / Protein: x / Nitrite: x   Leuk Esterase: x / RBC: x / WBC x   Sq Epi: x / Non Sq Epi: x / Bacteria: x        RADIOLOGY & ADDITIONAL STUDIES:

## 2025-03-23 NOTE — ED PROVIDER NOTE - PROGRESS NOTE DETAILS
Pelvic chaperoned by ZULEIMA Vu. Scant blood in vaginal vault. Patient evaluated by gyn pending recs. Patient received 2units prbc, will order US.  Judd Xavier PGY-3 Received signout Dr. TERRANCE Murphy  PMD [facility], [facility]  73-year-old male PMH A-fib on Eliquis, hypertension hyperlipidemia CAD status post PCI 10/2024 with #2 stents who presents to ER complains of palpitations onset approximate noon yesterday.  Patient states he was driving and felt palpitations and feeling "wobbly and out of sorts", with mild shortness of breath.  There was no syncope, chest pain, diaphoresis, abdominal pain, nausea vomiting, weakness, numbness.  Patient presented today when symptoms did not resolve.  Claims compliance on medications.  Physical exam adult male awake alert GCS 15, NAD appearing comfortable.  HEENT normocephalic/atraumatic.  Chest clear A&P.  No use of  muscles.  CV irregular irregular rapid.  Abdomen soft positive bowel sounds.  Neuro GCS 15 speech fluent moves all extremities.  MSK no pedal edema.  Hiram Edmond MD, Facep Received signout Dr. TERRANCE [id number] female past medical history menorrhagia last transfusion age 18 for same, none since had presented to ER complains of a vaginal bleeding and symptomatic anemia.  Patient is now status post 3 units PRBC still feels symptomatic, there is no active vaginal bleeding.  Sono shows uterine fibroids.  Patient was seen by GYN advised outpatient workup for myomectomy.  After transfusion patient is still symptomatic lightheaded without dizziness but feels much improved since she arrived.  Physical exam HEENT normocephalic atraumatic  Chest clear A&P.  Abdomen soft positive bowel sounds.  Neuro GCS 15 speech fluent moves all extremities  Plan repeat CBC posttransfusion, patient still symptomatic and will require additional units.  I discussed patient's care with Dr. AUGUST.  Eulgoio will see patient here and arrange appropriate outpatient follow-up.  Hiram Edmond MD, Facep

## 2025-03-23 NOTE — ED ADULT NURSE REASSESSMENT NOTE - NS ED NURSE REASSESS COMMENT FT1
0819 Blood trasfsuion finished at 0730  Pt tolerated Pt to US MPRN Attending Physician Statement  I have discussed the care of AshonDavisincluding pertinent history and exam findings,  with the resident. I have reviewed the key elements of all parts of the encounter with the resident.  I agree with the assessment, plan and orders as documented by the resident.  (GE Modifier)    Tinea Capitis/ Barbae- Lamisil 250 mg X $ weeks/ CK ALT/ Nizoral shampoo

## 2025-03-23 NOTE — ED PROVIDER NOTE - CLINICAL SUMMARY MEDICAL DECISION MAKING FREE TEXT BOX
Patient is in distress from her anemia.  Will transfuse to 7.  2 units PRBC immediately. CBC CMP after she is stable we could send for pelvic sonogram.  Will consult OB/GYN.  Anticipate she will require admission.–Adam Murphy

## 2025-03-23 NOTE — ED CDU PROVIDER DISPOSITION NOTE - ATTENDING APP SHARED VISIT CONTRIBUTION OF CARE
I have personally performed a face to face diagnostic evaluation on this patient.  I have reviewed the ACP note and agree with the history, exam, and plan of care, except as noted.  History and Exam by me shows  patient with prbc transfusion. feels much better. improved cbc. has f/u with obgyn tomorrow. stable for dc

## 2025-03-23 NOTE — ED CDU PROVIDER INITIAL DAY NOTE - PROGRESS NOTE DETAILS
seen again by ob, patient reports feeling substantially better. patient to be discharged with outpatient follow up. discussed kidney fcn and glucose, offered to start meds for DM but patient declines, wants to establish care, discussed warning signs and return precautions. also discussed recs for ob follow up and return precautions for sympotmatic anemia. patient demonstrates understanding that she will need additional care beyond this visit. will send info to referral coordinator.

## 2025-03-23 NOTE — CONSULT NOTE ADULT - ATTENDING COMMENTS
35y G0 with LMP 3/10 gyn consulted for symptomatic anemia and h/o DUB. currently her menses has stopped and reports no current active bleeding  lab reviewed  pelvic sonogram: result pending  labs reviewed  a/p  symptomatic anemia  h/o fibroid/adenomyosis  DUB currently not actively bleeding - minimal brownish dc noted on exam  f/u sonogram result  agree with blood transfusion  risk of endometrial hyperplasia, endometrial cancer, leiomyosarcoma  need for embx as outpatient to rule endometrial hyperplasia/endometrial cancer  need for cervical pap smear as outpatient at gyn clinic  treatment option including hormonal treatment, surgical management with myomectomy/hysterectomy to be further discussed and inplemented as outpatient at gyn clinic  patient needs to follow up at the GYN outpatient clinic w/in 1 week after discharge for further evaluation and treatment.   Dr. Garcia

## 2025-03-23 NOTE — ED ADULT NURSE REASSESSMENT NOTE - NS ED NURSE REASSESS COMMENT FT1
0634. 1 unit PRBC given. Consent in chart. Risks and benefits explained to patient. Patient verbalized understanding of risks and benefits. Patient aware of possible side effects. Vital signs stable. Second RN at bedside for confirmation.

## 2025-03-23 NOTE — ED ADULT NURSE REASSESSMENT NOTE - NS ED NURSE REASSESS COMMENT FT1
0414. 1 unit PRBC given. Consent in chart. Risks and benefits explained to patient. Patient verbalized understanding of risks and benefits. Patient aware of possible side effects. Vital signs stable. Second RN at bedside for confirmation. unit initiated at quickest rate possible rate 999ml/hr per order to RN per Attending MD Murphy

## 2025-03-23 NOTE — ED PROVIDER NOTE - OBJECTIVE STATEMENT
This is a 35-year-old female  with a history of heavy menstrual bleeding now coming in with wooziness and dizziness associated with multiple clots per vagina.  Her last period was 10 March lasted 3 days was fairly heavy resolved and then she developed a few days of very heavy clots with minimal blood.  She also has abdominal pain/pelvic pain/suprapubic pain.  No fevers chills head strike.  When she gets up she is really symptomatic.  She is short of breath and fatigue.  No chest discomfort.

## 2025-03-23 NOTE — CONSULT NOTE ADULT - ASSESSMENT
***Incomplete Note*** A/P: 35y G0 with LMP 3/10 presenting with symptomatic anemia in the setting of heavy vaginal bleeding. Patient with symptomatic anemia. Patient without active bleeding at this time. Patient with improvement in vital signs after transfusion. AUB with history of adenomyosis. US will assist to rule out other structural causes of bleeding, given patient exam with noted abdominal enlargement.       Recommendations:  - Agree with transfusion per ED   - F/u US   - Outpatient follow up with gyn for long term management   - No acute intervention if bleeding stable  - Gyn to follow      D/w Dr. Jose Yeager, PGY2    Spectra #[id number]      [facility]  [address] [place] [address] [place] [address]  [phone]      [facility]   [address]   [address]  [place] [address]  [phone]

## 2025-03-23 NOTE — ED CDU PROVIDER DISPOSITION NOTE - PATIENT PORTAL LINK FT
You can access the FollowMyHealth Patient Portal offered by [facility] by registering at the following website: [email or web address]. By joining [employer]’s FollowMyHealth portal, you will also be able to view your health information using other applications (apps) compatible with our system.

## 2025-03-23 NOTE — ED ADULT NURSE REASSESSMENT NOTE - NS ED NURSE REASSESS COMMENT FT1
0700 Report taken from night RN Pt has blood transfusing via pump Pt tolerating 3 rd unit without issues Pending US [facility]PRN

## 2025-03-23 NOTE — ED ADULT NURSE REASSESSMENT NOTE - NS ED NURSE REASSESS COMMENT FT1
15 mins post blood transfusion patient states she feels no different than before blood started. No blood transfusion reactions. Denies chest pain, shortness of breath, dizziness, difficulty breathing, numbness, tingling. IV site with blood transfusion shows no redness, swelling at site. Patient stable with no complaints at this time.

## 2025-03-23 NOTE — ED PROVIDER NOTE - ATTENDING CONTRIBUTION TO CARE
I am the primary author of this chart    Evaluating for critical conditions noted above. Ordering tests. Reviewing results. Ordering medications as noted in MAR. Discussions with consultant.

## 2025-03-24 LAB
A1C WITH ESTIMATED AVERAGE GLUCOSE RESULT: 6.2 % — HIGH (ref 4–5.6)
ESTIMATED AVERAGE GLUCOSE: 131 MG/DL — HIGH (ref 68–114)

## 2025-03-26 ENCOUNTER — NON-APPOINTMENT (OUTPATIENT)
Age: 35
End: 2025-03-26

## 2025-03-26 ENCOUNTER — APPOINTMENT (OUTPATIENT)
Dept: OBGYN | Facility: CLINIC | Age: 35
End: 2025-03-26
Payer: COMMERCIAL

## 2025-03-26 ENCOUNTER — LABORATORY RESULT (OUTPATIENT)
Age: 35
End: 2025-03-26

## 2025-03-26 VITALS
DIASTOLIC BLOOD PRESSURE: 82 MMHG | WEIGHT: 269.25 LBS | HEIGHT: 63 IN | SYSTOLIC BLOOD PRESSURE: 163 MMHG | BODY MASS INDEX: 47.71 KG/M2

## 2025-03-26 DIAGNOSIS — E11.9 TYPE 2 DIABETES MELLITUS W/OUT COMPLICATIONS: ICD-10-CM

## 2025-03-26 DIAGNOSIS — N92.0 EXCESSIVE AND FREQUENT MENSTRUATION WITH REGULAR CYCLE: ICD-10-CM

## 2025-03-26 DIAGNOSIS — Z01.419 ENCOUNTER FOR GYNECOLOGICAL EXAMINATION (GENERAL) (ROUTINE) W/OUT ABNORMAL FINDINGS: ICD-10-CM

## 2025-03-26 DIAGNOSIS — F32.A DEPRESSION, UNSPECIFIED: ICD-10-CM

## 2025-03-26 DIAGNOSIS — N80.03 ADENOMYOSIS OF THE UTERUS: ICD-10-CM

## 2025-03-26 DIAGNOSIS — D25.9 LEIOMYOMA OF UTERUS, UNSPECIFIED: ICD-10-CM

## 2025-03-26 PROCEDURE — G0444 DEPRESSION SCREEN ANNUAL: CPT | Mod: 59

## 2025-03-26 PROCEDURE — 99395 PREV VISIT EST AGE 18-39: CPT

## 2025-03-26 RX ORDER — MEDROXYPROGESTERONE ACETATE 150 MG/ML
150 INJECTION, SUSPENSION INTRAMUSCULAR
Qty: 1 | Refills: 3 | Status: ACTIVE | COMMUNITY
Start: 2025-03-26 | End: 1900-01-01

## 2025-03-27 ENCOUNTER — APPOINTMENT (OUTPATIENT)
Dept: OBGYN | Facility: CLINIC | Age: 35
End: 2025-03-27
Payer: COMMERCIAL

## 2025-03-27 VITALS
HEIGHT: 63 IN | BODY MASS INDEX: 48.55 KG/M2 | SYSTOLIC BLOOD PRESSURE: 139 MMHG | DIASTOLIC BLOOD PRESSURE: 81 MMHG | WEIGHT: 274 LBS

## 2025-03-27 DIAGNOSIS — Z30.42 ENCOUNTER FOR SURVEILLANCE OF INJECTABLE CONTRACEPTIVE: ICD-10-CM

## 2025-03-27 LAB
HCG UR QL: NEGATIVE
QUALITY CONTROL: YES

## 2025-03-27 PROCEDURE — 96372 THER/PROPH/DIAG INJ SC/IM: CPT

## 2025-03-27 PROCEDURE — 99212 OFFICE O/P EST SF 10 MIN: CPT | Mod: 25

## 2025-03-27 PROCEDURE — 81025 URINE PREGNANCY TEST: CPT

## 2025-04-01 ENCOUNTER — NON-APPOINTMENT (OUTPATIENT)
Age: 35
End: 2025-04-01

## 2025-04-01 DIAGNOSIS — B97.7 PAPILLOMAVIRUS AS THE CAUSE OF DISEASES CLASSIFIED ELSEWHERE: ICD-10-CM

## 2025-04-01 LAB
CYTOLOGY CVX/VAG DOC THIN PREP: ABNORMAL
HPV HIGH+LOW RISK DNA PNL CVX: DETECTED

## 2025-04-03 ENCOUNTER — OUTPATIENT (OUTPATIENT)
Dept: OUTPATIENT SERVICES | Facility: HOSPITAL | Age: 35
LOS: 1 days | Discharge: ANOTHER HOSPITAL | End: 2025-04-03
Payer: COMMERCIAL

## 2025-04-03 PROCEDURE — 90791 PSYCH DIAGNOSTIC EVALUATION: CPT | Mod: 95

## 2025-04-04 ENCOUNTER — OUTPATIENT (OUTPATIENT)
Dept: OUTPATIENT SERVICES | Facility: HOSPITAL | Age: 35
LOS: 1 days | Discharge: HOME | End: 2025-04-04

## 2025-04-04 DIAGNOSIS — D64.9 ANEMIA, UNSPECIFIED: ICD-10-CM

## 2025-04-07 ENCOUNTER — APPOINTMENT (OUTPATIENT)
Dept: HEMATOLOGY ONCOLOGY | Facility: CLINIC | Age: 35
End: 2025-04-07
Payer: COMMERCIAL

## 2025-04-07 DIAGNOSIS — D64.9 ANEMIA, UNSPECIFIED: ICD-10-CM

## 2025-04-07 PROCEDURE — 99204 OFFICE O/P NEW MOD 45 MIN: CPT | Mod: 95

## 2025-04-09 ENCOUNTER — LABORATORY RESULT (OUTPATIENT)
Age: 35
End: 2025-04-09

## 2025-04-09 PROCEDURE — 90834 PSYTX W PT 45 MINUTES: CPT | Mod: 95

## 2025-04-10 LAB
ALBUMIN SERPL ELPH-MCNC: 3.7 G/DL
ALP BLD-CCNC: 78 U/L
ALT SERPL-CCNC: 6 U/L
ANION GAP SERPL CALC-SCNC: 11 MMOL/L
AST SERPL-CCNC: 9 U/L
BASOPHILS # BLD AUTO: 0.07 K/UL
BASOPHILS NFR BLD AUTO: 0.9 %
BILIRUB SERPL-MCNC: 0.2 MG/DL
BUN SERPL-MCNC: 10 MG/DL
CALCIUM SERPL-MCNC: 8.6 MG/DL
CHLORIDE SERPL-SCNC: 109 MMOL/L
CO2 SERPL-SCNC: 20 MMOL/L
CREAT SERPL-MCNC: 1.14 MG/DL
CRP SERPL-MCNC: 21 MG/L
EGFRCR SERPLBLD CKD-EPI 2021: 65 ML/MIN/1.73M2
EOSINOPHIL # BLD AUTO: 0.2 K/UL
EOSINOPHIL NFR BLD AUTO: 2.5 %
ERYTHROCYTE [SEDIMENTATION RATE] IN BLOOD BY WESTERGREN METHOD: 90 MM/HR
FERRITIN SERPL-MCNC: 16 NG/ML
FOLATE SERPL-MCNC: 7.1 NG/ML
GLUCOSE SERPL-MCNC: 216 MG/DL
HAPTOGLOB SERPL-MCNC: 236 MG/DL
HCT VFR BLD CALC: 26.9 %
HGB BLD-MCNC: 7.6 G/DL
IMM GRANULOCYTES NFR BLD AUTO: 0.2 %
IRON SATN MFR SERPL: 4 %
IRON SERPL-MCNC: 12 UG/DL
LDH SERPL-CCNC: 170 U/L
LYMPHOCYTES # BLD AUTO: 1.76 K/UL
LYMPHOCYTES NFR BLD AUTO: 21.9 %
MAN DIFF?: NORMAL
MCHC RBC-ENTMCNC: 20.7 PG
MCHC RBC-ENTMCNC: 28.3 G/DL
MCV RBC AUTO: 73.3 FL
MONOCYTES # BLD AUTO: 0.53 K/UL
MONOCYTES NFR BLD AUTO: 6.6 %
NEUTROPHILS # BLD AUTO: 5.46 K/UL
NEUTROPHILS NFR BLD AUTO: 67.9 %
PLATELET # BLD AUTO: 459 K/UL
POTASSIUM SERPL-SCNC: 4.4 MMOL/L
PROT SERPL-MCNC: 7.3 G/DL
RBC # BLD: 3.67 M/UL
RBC # BLD: 3.67 M/UL
RBC # FLD: 25.2 %
RETICS # AUTO: 0.7 %
RETICS AGGREG/RBC NFR: 23.9 K/UL
SODIUM SERPL-SCNC: 140 MMOL/L
TIBC SERPL-MCNC: 315 UG/DL
UIBC SERPL-MCNC: 303 UG/DL
VIT B12 SERPL-MCNC: 642 PG/ML
WBC # FLD AUTO: 8.04 K/UL

## 2025-04-11 LAB
HGB A MFR BLD: 98 %
HGB A2 MFR BLD: 2 %
HGB FRACT BLD-IMP: NORMAL

## 2025-04-15 ENCOUNTER — NON-APPOINTMENT (OUTPATIENT)
Age: 35
End: 2025-04-15

## 2025-04-15 ENCOUNTER — APPOINTMENT (OUTPATIENT)
Dept: INFUSION THERAPY | Facility: HOSPITAL | Age: 35
End: 2025-04-15

## 2025-04-16 DIAGNOSIS — D50.9 IRON DEFICIENCY ANEMIA, UNSPECIFIED: ICD-10-CM

## 2025-04-16 PROCEDURE — 90834 PSYTX W PT 45 MINUTES: CPT | Mod: 95

## 2025-04-22 ENCOUNTER — APPOINTMENT (OUTPATIENT)
Dept: INFUSION THERAPY | Facility: HOSPITAL | Age: 35
End: 2025-04-22

## 2025-04-22 ENCOUNTER — TRANSCRIPTION ENCOUNTER (OUTPATIENT)
Age: 35
End: 2025-04-22

## 2025-04-23 PROCEDURE — 90792 PSYCH DIAG EVAL W/MED SRVCS: CPT

## 2025-04-23 PROCEDURE — 90834 PSYTX W PT 45 MINUTES: CPT | Mod: 95

## 2025-04-29 ENCOUNTER — APPOINTMENT (OUTPATIENT)
Dept: INFUSION THERAPY | Facility: HOSPITAL | Age: 35
End: 2025-04-29

## 2025-05-01 ENCOUNTER — OUTPATIENT (OUTPATIENT)
Dept: OUTPATIENT SERVICES | Facility: HOSPITAL | Age: 35
LOS: 1 days | End: 2025-05-01
Payer: COMMERCIAL

## 2025-05-01 ENCOUNTER — APPOINTMENT (OUTPATIENT)
Dept: INTERNAL MEDICINE | Facility: CLINIC | Age: 35
End: 2025-05-01

## 2025-05-01 ENCOUNTER — LABORATORY RESULT (OUTPATIENT)
Age: 35
End: 2025-05-01

## 2025-05-01 ENCOUNTER — APPOINTMENT (OUTPATIENT)
Dept: RADIOLOGY | Facility: CLINIC | Age: 35
End: 2025-05-01
Payer: COMMERCIAL

## 2025-05-01 VITALS
SYSTOLIC BLOOD PRESSURE: 110 MMHG | WEIGHT: 256 LBS | BODY MASS INDEX: 43.71 KG/M2 | OXYGEN SATURATION: 98 % | HEART RATE: 106 BPM | HEIGHT: 64 IN | DIASTOLIC BLOOD PRESSURE: 74 MMHG

## 2025-05-01 DIAGNOSIS — D50.9 IRON DEFICIENCY ANEMIA, UNSPECIFIED: ICD-10-CM

## 2025-05-01 DIAGNOSIS — L91.0 HYPERTROPHIC SCAR: ICD-10-CM

## 2025-05-01 DIAGNOSIS — R45.851 SUICIDAL IDEATIONS: ICD-10-CM

## 2025-05-01 DIAGNOSIS — F32.A DEPRESSION, UNSPECIFIED: ICD-10-CM

## 2025-05-01 DIAGNOSIS — I10 ESSENTIAL (PRIMARY) HYPERTENSION: ICD-10-CM

## 2025-05-01 DIAGNOSIS — N18.32 CHRONIC KIDNEY DISEASE, STAGE 3B: ICD-10-CM

## 2025-05-01 DIAGNOSIS — D25.9 LEIOMYOMA OF UTERUS, UNSPECIFIED: ICD-10-CM

## 2025-05-01 DIAGNOSIS — L02.92 FURUNCLE, UNSPECIFIED: ICD-10-CM

## 2025-05-01 DIAGNOSIS — E11.9 TYPE 2 DIABETES MELLITUS W/OUT COMPLICATIONS: ICD-10-CM

## 2025-05-01 DIAGNOSIS — D64.9 ANEMIA, UNSPECIFIED: ICD-10-CM

## 2025-05-01 DIAGNOSIS — E66.01 MORBID (SEVERE) OBESITY DUE TO EXCESS CALORIES: ICD-10-CM

## 2025-05-01 DIAGNOSIS — M25.512 PAIN IN LEFT SHOULDER: ICD-10-CM

## 2025-05-01 DIAGNOSIS — Z13.39 ENCOUNTER FOR SCREENING EXAM FOR OTHER MENTAL HEALTH AND BEHAVIORAL DISORDERS: ICD-10-CM

## 2025-05-01 PROCEDURE — G0463: CPT

## 2025-05-01 PROCEDURE — 99205 OFFICE O/P NEW HI 60 MIN: CPT

## 2025-05-01 PROCEDURE — 83036 HEMOGLOBIN GLYCOSYLATED A1C: CPT

## 2025-05-01 PROCEDURE — 82962 GLUCOSE BLOOD TEST: CPT

## 2025-05-01 PROCEDURE — G0442 ANNUAL ALCOHOL SCREEN 15 MIN: CPT | Mod: 59

## 2025-05-01 PROCEDURE — 73030 X-RAY EXAM OF SHOULDER: CPT

## 2025-05-01 PROCEDURE — 73030 X-RAY EXAM OF SHOULDER: CPT | Mod: 26,LT

## 2025-05-01 RX ORDER — METFORMIN HYDROCHLORIDE 500 MG/1
500 TABLET, COATED ORAL
Qty: 90 | Refills: 3 | Status: ACTIVE | COMMUNITY
Start: 2025-05-01 | End: 1900-01-01

## 2025-05-01 RX ORDER — BLOOD-GLUCOSE METER
W/DEVICE EACH MISCELLANEOUS
Qty: 1 | Refills: 0 | Status: ACTIVE | COMMUNITY
Start: 2025-05-01 | End: 1900-01-01

## 2025-05-01 RX ORDER — BLOOD SUGAR DIAGNOSTIC
STRIP MISCELLANEOUS
Qty: 1 | Refills: 3 | Status: ACTIVE | COMMUNITY
Start: 2025-05-01 | End: 1900-01-01

## 2025-05-01 RX ORDER — ESCITALOPRAM OXALATE 5 MG/1
5 TABLET ORAL DAILY
Qty: 90 | Refills: 3 | Status: ACTIVE | COMMUNITY
Start: 2025-05-01

## 2025-05-01 RX ORDER — 70%ISOPROPYL ALCOHOL 0.7 ML/ML
70 SWAB TOPICAL
Qty: 1 | Refills: 3 | Status: ACTIVE | COMMUNITY
Start: 2025-05-01 | End: 1900-01-01

## 2025-05-03 ENCOUNTER — TRANSCRIPTION ENCOUNTER (OUTPATIENT)
Age: 35
End: 2025-05-03

## 2025-05-03 DIAGNOSIS — E55.9 VITAMIN D DEFICIENCY, UNSPECIFIED: ICD-10-CM

## 2025-05-03 LAB
25(OH)D3 SERPL-MCNC: 15 NG/ML
ALBUMIN SERPL ELPH-MCNC: 4.2 G/DL
ALP BLD-CCNC: 89 U/L
ALT SERPL-CCNC: 7 U/L
ANION GAP SERPL CALC-SCNC: 21 MMOL/L
AST SERPL-CCNC: 16 U/L
BASOPHILS # BLD AUTO: 0.34 K/UL
BASOPHILS NFR BLD AUTO: 2.6 %
BILIRUB SERPL-MCNC: 0.4 MG/DL
BUN SERPL-MCNC: 14 MG/DL
CALCIUM SERPL-MCNC: 9.9 MG/DL
CHLORIDE SERPL-SCNC: 102 MMOL/L
CHOLEST SERPL-MCNC: 191 MG/DL
CO2 SERPL-SCNC: 15 MMOL/L
CREAT SERPL-MCNC: 1.64 MG/DL
EGFRCR SERPLBLD CKD-EPI 2021: 42 ML/MIN/1.73M2
EOSINOPHIL # BLD AUTO: 0.12 K/UL
EOSINOPHIL NFR BLD AUTO: 0.9 %
ESTIMATED AVERAGE GLUCOSE: 131 MG/DL
FERRITIN SERPL-MCNC: 328 NG/ML
FOLATE SERPL-MCNC: 10.5 NG/ML
GLUCOSE BLDC GLUCOMTR-MCNC: 101
GLUCOSE SERPL-MCNC: 101 MG/DL
HBA1C MFR BLD HPLC: 6
HBA1C MFR BLD HPLC: 6.2 %
HCT VFR BLD CALC: 38.7 %
HDLC SERPL-MCNC: 41 MG/DL
HGB BLD-MCNC: 11.3 G/DL
IRON SATN MFR SERPL: 12 %
IRON SERPL-MCNC: 32 UG/DL
LDLC SERPL-MCNC: 125 MG/DL
LYMPHOCYTES # BLD AUTO: 2.78 K/UL
LYMPHOCYTES NFR BLD AUTO: 21.5 %
MAN DIFF?: NORMAL
MCHC RBC-ENTMCNC: 22.8 PG
MCHC RBC-ENTMCNC: 29.2 G/DL
MCV RBC AUTO: 78 FL
MONOCYTES # BLD AUTO: 0.89 K/UL
MONOCYTES NFR BLD AUTO: 6.9 %
NEUTROPHILS # BLD AUTO: 8.46 K/UL
NEUTROPHILS NFR BLD AUTO: 65.5 %
NONHDLC SERPL-MCNC: 149 MG/DL
PLATELET # BLD AUTO: 453 K/UL
POTASSIUM SERPL-SCNC: 4.5 MMOL/L
PROT SERPL-MCNC: 8.1 G/DL
RBC # BLD: 4.96 M/UL
RBC # FLD: 28 %
SODIUM SERPL-SCNC: 138 MMOL/L
T4 FREE SERPL-MCNC: 1.3 NG/DL
TIBC SERPL-MCNC: 273 UG/DL
TRIGL SERPL-MCNC: 131 MG/DL
TSH SERPL-ACNC: 1.41 UIU/ML
UIBC SERPL-MCNC: 241 UG/DL
VIT B12 SERPL-MCNC: 958 PG/ML
WBC # FLD AUTO: 12.91 K/UL

## 2025-05-03 RX ORDER — SEMAGLUTIDE 0.68 MG/ML
2 INJECTION, SOLUTION SUBCUTANEOUS
Qty: 1 | Refills: 1 | Status: ACTIVE | COMMUNITY
Start: 2025-05-03 | End: 1900-01-01

## 2025-05-03 RX ORDER — MULTIVIT-MIN/IRON/FOLIC ACID/K 18-600-40
50 MCG CAPSULE ORAL
Qty: 90 | Refills: 3 | Status: ACTIVE | COMMUNITY
Start: 2025-05-03 | End: 1900-01-01

## 2025-05-03 RX ORDER — CHLORHEXIDINE GLUCONATE 4 %
325 (65 FE) LIQUID (ML) TOPICAL
Qty: 90 | Refills: 3 | Status: ACTIVE | COMMUNITY
Start: 2025-05-03 | End: 1900-01-01

## 2025-05-04 PROBLEM — Z13.39 SCREENING FOR ALCOHOLISM: Status: ACTIVE | Noted: 2025-05-04

## 2025-05-04 PROBLEM — N18.32 STAGE 3B CHRONIC KIDNEY DISEASE: Status: ACTIVE | Noted: 2025-05-03

## 2025-05-06 ENCOUNTER — APPOINTMENT (OUTPATIENT)
Dept: INFUSION THERAPY | Facility: HOSPITAL | Age: 35
End: 2025-05-06

## 2025-05-12 DIAGNOSIS — D50.9 IRON DEFICIENCY ANEMIA, UNSPECIFIED: ICD-10-CM

## 2025-05-12 DIAGNOSIS — E66.01 MORBID (SEVERE) OBESITY DUE TO EXCESS CALORIES: ICD-10-CM

## 2025-05-12 DIAGNOSIS — N18.32 CHRONIC KIDNEY DISEASE, STAGE 3B: ICD-10-CM

## 2025-05-12 DIAGNOSIS — L02.92 FURUNCLE, UNSPECIFIED: ICD-10-CM

## 2025-05-12 DIAGNOSIS — F32.A DEPRESSION, UNSPECIFIED: ICD-10-CM

## 2025-05-12 DIAGNOSIS — M25.512 PAIN IN LEFT SHOULDER: ICD-10-CM

## 2025-05-12 DIAGNOSIS — D25.9 LEIOMYOMA OF UTERUS, UNSPECIFIED: ICD-10-CM

## 2025-05-12 DIAGNOSIS — R45.851 SUICIDAL IDEATIONS: ICD-10-CM

## 2025-05-12 DIAGNOSIS — Z13.39 ENCOUNTER FOR SCREENING EXAMINATION FOR OTHER MENTAL HEALTH AND BEHAVIORAL DISORDERS: ICD-10-CM

## 2025-05-12 DIAGNOSIS — E11.9 TYPE 2 DIABETES MELLITUS WITHOUT COMPLICATIONS: ICD-10-CM

## 2025-05-20 ENCOUNTER — APPOINTMENT (OUTPATIENT)
Dept: INFUSION THERAPY | Facility: HOSPITAL | Age: 35
End: 2025-05-20

## 2025-05-23 PROCEDURE — 90837 PSYTX W PT 60 MINUTES: CPT | Mod: 95

## 2025-05-27 ENCOUNTER — APPOINTMENT (OUTPATIENT)
Dept: INFUSION THERAPY | Facility: HOSPITAL | Age: 35
End: 2025-05-27

## 2025-06-16 NOTE — ED PROVIDER NOTE - PRINCIPAL DIAGNOSIS

## 2025-06-21 ENCOUNTER — APPOINTMENT (OUTPATIENT)
Dept: INFUSION THERAPY | Facility: HOSPITAL | Age: 35
End: 2025-06-21

## 2025-06-26 ENCOUNTER — NON-APPOINTMENT (OUTPATIENT)
Age: 35
End: 2025-06-26

## 2025-06-27 ENCOUNTER — NON-APPOINTMENT (OUTPATIENT)
Age: 35
End: 2025-06-27

## 2025-06-27 RX ORDER — CHLORHEXIDINE GLUCONATE 4 %
325 (65 FE) LIQUID (ML) TOPICAL 3 TIMES DAILY
Qty: 180 | Refills: 3 | Status: ACTIVE | COMMUNITY
Start: 2025-06-27 | End: 1900-01-01

## 2025-06-27 RX ORDER — DOCUSATE SODIUM 100 MG/1
100 CAPSULE ORAL 3 TIMES DAILY
Qty: 180 | Refills: 3 | Status: ACTIVE | COMMUNITY
Start: 2025-06-27 | End: 1900-01-01

## 2025-06-28 ENCOUNTER — APPOINTMENT (OUTPATIENT)
Dept: INFUSION THERAPY | Facility: HOSPITAL | Age: 35
End: 2025-06-28